# Patient Record
Sex: MALE | Race: WHITE | NOT HISPANIC OR LATINO | Employment: OTHER | URBAN - METROPOLITAN AREA
[De-identification: names, ages, dates, MRNs, and addresses within clinical notes are randomized per-mention and may not be internally consistent; named-entity substitution may affect disease eponyms.]

---

## 2023-10-09 ENCOUNTER — TELEPHONE (OUTPATIENT)
Dept: NEUROLOGY | Facility: CLINIC | Age: 68
End: 2023-10-09
Payer: COMMERCIAL

## 2023-10-09 DIAGNOSIS — G43.711 INTRACTABLE CHRONIC MIGRAINE WITHOUT AURA AND WITH STATUS MIGRAINOSUS: ICD-10-CM

## 2023-10-09 RX ORDER — HYDROCODONE BITARTRATE AND ACETAMINOPHEN 7.5; 325 MG/1; MG/1
1 TABLET ORAL EVERY 12 HOURS PRN
Qty: 40 TABLET | Refills: 0 | Status: SHIPPED | OUTPATIENT
Start: 2023-10-09 | End: 2023-11-09 | Stop reason: SDUPTHER

## 2023-10-09 RX ORDER — HYDROCODONE BITARTRATE AND ACETAMINOPHEN 7.5; 325 MG/1; MG/1
1 TABLET ORAL EVERY 12 HOURS PRN
COMMUNITY
Start: 2023-10-09 | End: 2023-10-09 | Stop reason: SDUPTHER

## 2023-10-11 DIAGNOSIS — G44.029 CHRONIC CLUSTER HEADACHE, NOT INTRACTABLE: ICD-10-CM

## 2023-10-11 RX ORDER — GALCANEZUMAB 100 MG/ML
300 INJECTION, SOLUTION SUBCUTANEOUS
COMMUNITY
End: 2023-10-11 | Stop reason: SDUPTHER

## 2023-10-11 RX ORDER — GALCANEZUMAB 100 MG/ML
300 INJECTION, SOLUTION SUBCUTANEOUS
Qty: 3 ML | Refills: 6 | Status: SHIPPED | OUTPATIENT
Start: 2023-10-11 | End: 2024-05-09 | Stop reason: SDUPTHER

## 2023-11-08 PROBLEM — G89.29 CHRONIC HEADACHES: Status: ACTIVE | Noted: 2023-11-08

## 2023-11-08 PROBLEM — E78.00 ELEVATED SERUM CHOLESTEROL: Status: ACTIVE | Noted: 2023-11-08

## 2023-11-08 PROBLEM — E55.9 VITAMIN D DEFICIENCY: Status: ACTIVE | Noted: 2023-11-08

## 2023-11-08 PROBLEM — I77.819 DILATATION OF AORTA (CMS-HCC): Status: ACTIVE | Noted: 2023-11-08

## 2023-11-08 PROBLEM — M54.2 CERVICALGIA OF OCCIPITO-ATLANTO-AXIAL REGION: Status: ACTIVE | Noted: 2023-11-08

## 2023-11-08 PROBLEM — R51.9 CHRONIC HEADACHES: Status: ACTIVE | Noted: 2023-11-08

## 2023-11-08 PROBLEM — R94.31 ABNORMAL EKG: Status: ACTIVE | Noted: 2023-11-08

## 2023-11-08 PROBLEM — G44.009 CLUSTER HEADACHE: Status: ACTIVE | Noted: 2023-11-08

## 2023-11-08 PROBLEM — F41.9 ANXIETY: Status: ACTIVE | Noted: 2023-11-08

## 2023-11-08 RX ORDER — SUMATRIPTAN SUCCINATE 100 MG/1
100 TABLET ORAL AS NEEDED
COMMUNITY
End: 2024-02-22 | Stop reason: SDUPTHER

## 2023-11-08 RX ORDER — POTASSIUM CHLORIDE 1500 MG/1
1 TABLET, EXTENDED RELEASE ORAL DAILY
COMMUNITY
Start: 2015-09-28

## 2023-11-08 RX ORDER — ATORVASTATIN CALCIUM 80 MG/1
80 TABLET, FILM COATED ORAL DAILY
COMMUNITY
End: 2023-11-09

## 2023-11-08 RX ORDER — L.ACID,FERM,PLA,RHA/B.BIF,LONG 126 MG
TABLET, DELAYED AND EXTENDED RELEASE ORAL
COMMUNITY
Start: 2022-08-04

## 2023-11-08 RX ORDER — PANTOPRAZOLE SODIUM 40 MG/1
40 TABLET, DELAYED RELEASE ORAL
COMMUNITY
End: 2024-05-09 | Stop reason: ALTCHOICE

## 2023-11-08 RX ORDER — TRIAMTERENE/HYDROCHLOROTHIAZID 37.5-25 MG
1 TABLET ORAL DAILY
COMMUNITY

## 2023-11-08 RX ORDER — ALPRAZOLAM 0.25 MG/1
0.25 TABLET ORAL
COMMUNITY
Start: 2023-09-12

## 2023-11-08 RX ORDER — FUROSEMIDE 20 MG/1
20 TABLET ORAL
COMMUNITY
Start: 2023-09-17

## 2023-11-08 RX ORDER — CITALOPRAM 40 MG/1
40 TABLET, FILM COATED ORAL DAILY
COMMUNITY

## 2023-11-08 RX ORDER — ARIPIPRAZOLE 2 MG/1
1 TABLET ORAL NIGHTLY
COMMUNITY
Start: 2022-05-05 | End: 2024-05-29

## 2023-11-08 RX ORDER — CYCLOBENZAPRINE HCL 10 MG
10 TABLET ORAL
COMMUNITY
Start: 2022-11-30 | End: 2024-05-09 | Stop reason: ALTCHOICE

## 2023-11-08 RX ORDER — VERAPAMIL HYDROCHLORIDE 240 MG/1
1 TABLET, FILM COATED, EXTENDED RELEASE ORAL 2 TIMES DAILY
COMMUNITY
Start: 2016-07-22

## 2023-11-08 RX ORDER — ROSUVASTATIN CALCIUM 40 MG/1
40 TABLET, COATED ORAL DAILY
COMMUNITY
Start: 2023-09-10

## 2023-11-08 RX ORDER — PANTOPRAZOLE SODIUM 20 MG/1
20 TABLET, DELAYED RELEASE ORAL
COMMUNITY
Start: 2023-10-08 | End: 2024-05-09 | Stop reason: ALTCHOICE

## 2023-11-08 RX ORDER — GABAPENTIN 300 MG/1
300 CAPSULE ORAL 3 TIMES DAILY
COMMUNITY
Start: 2023-03-03 | End: 2023-04-02 | Stop reason: WASHOUT

## 2023-11-08 RX ORDER — METHYLPREDNISOLONE 4 MG/1
TABLET ORAL
COMMUNITY
Start: 2022-11-30 | End: 2024-05-09 | Stop reason: ALTCHOICE

## 2023-11-08 RX ORDER — VIT C/E/ZN/COPPR/LUTEIN/ZEAXAN 250MG-90MG
25 CAPSULE ORAL
COMMUNITY
Start: 2018-06-25

## 2023-11-09 ENCOUNTER — OFFICE VISIT (OUTPATIENT)
Dept: NEUROLOGY | Facility: CLINIC | Age: 68
End: 2023-11-09
Payer: COMMERCIAL

## 2023-11-09 VITALS — DIASTOLIC BLOOD PRESSURE: 74 MMHG | SYSTOLIC BLOOD PRESSURE: 128 MMHG | HEART RATE: 78 BPM | RESPIRATION RATE: 18 BRPM

## 2023-11-09 DIAGNOSIS — G43.711 INTRACTABLE CHRONIC MIGRAINE WITHOUT AURA AND WITH STATUS MIGRAINOSUS: ICD-10-CM

## 2023-11-09 DIAGNOSIS — G44.029 CHRONIC CLUSTER HEADACHE, NOT INTRACTABLE: Primary | ICD-10-CM

## 2023-11-09 PROCEDURE — 1036F TOBACCO NON-USER: CPT | Performed by: PSYCHIATRY & NEUROLOGY

## 2023-11-09 PROCEDURE — 1159F MED LIST DOCD IN RCRD: CPT | Performed by: PSYCHIATRY & NEUROLOGY

## 2023-11-09 PROCEDURE — 1160F RVW MEDS BY RX/DR IN RCRD: CPT | Performed by: PSYCHIATRY & NEUROLOGY

## 2023-11-09 PROCEDURE — 99214 OFFICE O/P EST MOD 30 MIN: CPT | Performed by: PSYCHIATRY & NEUROLOGY

## 2023-11-09 RX ORDER — HYDROCODONE BITARTRATE AND ACETAMINOPHEN 7.5; 325 MG/1; MG/1
1 TABLET ORAL EVERY 12 HOURS PRN
Qty: 40 TABLET | Refills: 0 | Status: SHIPPED | OUTPATIENT
Start: 2023-11-11 | End: 2023-12-11 | Stop reason: SDUPTHER

## 2023-11-09 ASSESSMENT — PATIENT HEALTH QUESTIONNAIRE - PHQ9
2. FEELING DOWN, DEPRESSED OR HOPELESS: NOT AT ALL
1. LITTLE INTEREST OR PLEASURE IN DOING THINGS: NOT AT ALL
SUM OF ALL RESPONSES TO PHQ9 QUESTIONS 1 AND 2: 0

## 2023-11-09 NOTE — PROGRESS NOTES
OARRS:    I have personally reviewed the OARRS report for Colby Santa. I have considered the risks of abuse, dependence, addiction and diversion    Is the patient prescribed a combination of a benzodiazepine and opioid?  Yes, I feel it is clincially indicated to continue the medication and have discussed with the patient risks/benefits/alternatives.    Last Urine Drug Screen / ordered today: 4/23    Results are as expected.     Clinical rationale for not completing a Urine Drug Screen: Has urine done at [facility] due to insurance limits      Controlled Substance Agreement:  Date of the Last Agreement: 11/9/23  Reviewed Controlled Substance Agreement including but not limited to the benefits, risks, and alternatives to treatment with a Controlled Substance medication(s).    Opioids:  What is the patient's goal of therapy? Cluster headache control  Is this being achieved with current treatment? Yes    I have calculated the patient's Morphine Dose Equivalent (MED):   I have considered referral to Pain Management and/or a specialist, and do not feel it is necessary at this time.    I feel that it is clinically indicated to continue this current medication regimen after consideration of alternative therapies, and other non-opioid treatment.    Opioid Risk Screening: Benzodiazepine and opioid being used without consequence      Pain Assessment: Hydrocodone helps with the pain         Subjective     Chief Complaint: Cluster Headache    Colby Santa is a 68 y.o. year old male who presents with chief complaint of cluster headaches.    HPI    Continues monthly Emgality cluster dosing.  Been on emgality for 2 years. Reports daytime clusters (pain 6-8/10)that he can get rid of with sumatriptan, night time headaches can't get rid of with sumatriptan, needs hydrocodone and oxygen.   2-3 clusters per week. In the Last 30-45 days pain has decreased to 4/10 pain Thinks it is better weather in summer for him. Has been able to  treat with sumatriptan and has not needed Vicodin as pain level has been less. Trigger is zoom meetings. Has not needed Oxygen PRN. In past 45 days.   Treats within 20-30 min completely. These headaches worsen when the cooler weather starts. Cluster headache behind right eye. Feels like ice pik.    If not complete relief from Vicodin and has lingering aching head pain in evening will take Sumatriptan      Has been walking more and enjoying the exercise and feels better. Did not feel motivated to walk outside until friend moved into area. Also recently had hip injection and can walk without a cane. Looking at hip surgeyr in 1 year. Celexa/abilify combo continues to work well for mood stabilization. More energy and sleeping better.   Work stress is about the same or a little increased but feels is managing well.   Has not been causing any headaches.     Gets all over headache every once in a while. With rare exception it is not any where near as painful as cluster.   Treats with ibuprofen and helpful. Voltaren gel helps. PT helps  Tries to limit ibuprofen to taking with food as led in past to his gastric ulcer.     Migraine More holocephalic and throbbing. Rare  Sumatriptan and Vicodin helpful   Associated nausea, light and noise sensitivity.  Triggers are stress    aortic dilation cardiologist screens every other year. Cardiologist september 18th for tachycardia  Medication-induced Stress test couple of weeks ago with cardiology, reports test was normal and was told he is at low risk for heart disease. Had a cluster every day for several days following this test.     Balance problem in PT better with hip pain relieved.  Reports he may need hip surgery    Work attendance or other daily activities are not affected by the headaches.    Current Acute Headache Treatment Sumatriptan   Current Preventative Headache Treatment Galcanezumab (Emgality)   Previous Acute Headache Treatment  None   Previous Preventative Headache  Treatment None       ROS: As per HPI, otherwise all other systems have been reviewed are negative for complaint.     Review of Systems    Past Medical History:   Diagnosis Date    Cluster headache     Headache     Headache, tension-type     Migraine     Other long term (current) drug therapy 02/03/2022    Long term use of drug    Other long term (current) drug therapy 05/06/2021    Controlled substance agreement signed     Past Surgical History:   Procedure Laterality Date    OTHER SURGICAL HISTORY  09/10/2019    No history of surgery     Family History   Problem Relation Name Age of Onset    COPD Mother      Lung cancer Mother      Dementia Father Colby     Parkinsonism Father Colby      Social History     Tobacco Use    Smoking status: Never    Smokeless tobacco: Never   Substance Use Topics    Alcohol use: Yes     Alcohol/week: 7.0 standard drinks of alcohol     Types: 7 Standard drinks or equivalent per week        Objective   /74   Pulse 78   Resp 18       Results  No visits with results within 2 Month(s) from this visit.   Latest known visit with results is:   Legacy Encounter on 01/22/2021   Component Date Value    Cholesterol 01/22/2021 193     HDL 01/22/2021 64.0     Cholesterol/HDL Ratio 01/22/2021 3.0     LDL 01/22/2021 102 (H)     VLDL 01/22/2021 27     Triglycerides 01/22/2021 136      Assessment/Plan   Problem List Items Addressed This Visit          Neuro    Cluster headache - Primary     Other Visit Diagnoses       Intractable chronic migraine without aura and with status migrainosus        Relevant Medications    HYDROcodone-acetaminophen (Norco) 7.5-325 mg tablet (Start on 11/11/2023)          Impression:  Mr. Santa is a 67 yo gentleman with aortic dilation, hyperlipidemia, anxiety, returning to clinic today for management of chronic cluster headaches, being treated with Emgality, sumatriptan PRN, Hydrocodone-acetaminophen Prn, oxygen PRN. Cluster headaches patient reports are well  controlled, still 2-3 times a week in frequency greatly reduced from daily before treatment.     Plan:  -Continue Emgality 300mg injection monthly  -Continue Sumatriptan 100mg PRN  -Continuing acetaminophen hydrocodone PRN, consent renewed today.  -Continue PRN oxygen  -Return to clinic in 3 months    Елена Lipscomb MD  Neurology PGY-2    Pt seen and discussed with the attending physician, Dr. Millan

## 2023-12-07 ENCOUNTER — TELEPHONE (OUTPATIENT)
Dept: NEUROLOGY | Facility: CLINIC | Age: 68
End: 2023-12-07
Payer: COMMERCIAL

## 2023-12-07 NOTE — TELEPHONE ENCOUNTER
Called to refill HYDROcodone-acetaminophen (Norco) 7.5-325 mg tablet.  [facility]ie is [facility]pharmacy #[id number] - [place], [place]

## 2023-12-11 DIAGNOSIS — G43.711 INTRACTABLE CHRONIC MIGRAINE WITHOUT AURA AND WITH STATUS MIGRAINOSUS: ICD-10-CM

## 2023-12-11 RX ORDER — HYDROCODONE BITARTRATE AND ACETAMINOPHEN 7.5; 325 MG/1; MG/1
1 TABLET ORAL EVERY 12 HOURS PRN
Qty: 40 TABLET | Refills: 0 | Status: SHIPPED | OUTPATIENT
Start: 2023-12-11 | End: 2024-01-05 | Stop reason: SDUPTHER

## 2024-01-05 ENCOUNTER — TELEPHONE (OUTPATIENT)
Dept: NEUROLOGY | Facility: CLINIC | Age: 69
End: 2024-01-05
Payer: COMMERCIAL

## 2024-01-05 DIAGNOSIS — G43.711 INTRACTABLE CHRONIC MIGRAINE WITHOUT AURA AND WITH STATUS MIGRAINOSUS: ICD-10-CM

## 2024-01-05 RX ORDER — HYDROCODONE BITARTRATE AND ACETAMINOPHEN 7.5; 325 MG/1; MG/1
1 TABLET ORAL EVERY 12 HOURS PRN
Qty: 40 TABLET | Refills: 0 | Status: SHIPPED | OUTPATIENT
Start: 2024-01-10 | End: 2024-02-07 | Stop reason: SDUPTHER

## 2024-01-05 NOTE — TELEPHONE ENCOUNTER
HYDROcodone-acetaminophen (Norco) 7.5-325 mg tablet. Pharmacy is [facility]/pharmacy #[id number] - [place], [place]

## 2024-02-01 ENCOUNTER — APPOINTMENT (OUTPATIENT)
Dept: NEUROLOGY | Facility: CLINIC | Age: 69
End: 2024-02-01
Payer: COMMERCIAL

## 2024-02-05 ENCOUNTER — TELEMEDICINE (OUTPATIENT)
Dept: NEUROLOGY | Facility: CLINIC | Age: 69
End: 2024-02-05
Payer: COMMERCIAL

## 2024-02-05 DIAGNOSIS — G44.029 CHRONIC CLUSTER HEADACHE, NOT INTRACTABLE: Primary | ICD-10-CM

## 2024-02-05 PROCEDURE — 1036F TOBACCO NON-USER: CPT

## 2024-02-05 PROCEDURE — 1159F MED LIST DOCD IN RCRD: CPT

## 2024-02-05 PROCEDURE — 99213 OFFICE O/P EST LOW 20 MIN: CPT

## 2024-02-05 NOTE — PATIENT INSTRUCTIONS
I am glad to hear you are doing well.   Continue emgality for prevention of cluster headaches and sumatriptan and hydrocodone for rescue.   Urine test will need to be updated for April 2024.   Follow up in 3 months or sooner as needed.

## 2024-02-05 NOTE — ASSESSMENT & PLAN NOTE
Doing much better since being on emgality. Had a brief 10 day period with more clusters when it was very cold otherwise only 2-3x per week and manageable with sumatriptan first then hydrocodone if needed.   - OARRS reviewed  - UDS and CSA up to date. UDS needs updated in April, pt aware.   - Dr. Millan to refill hydrocodone.     F/u in 3 mos

## 2024-02-05 NOTE — PROGRESS NOTES
"Subjective     Colby Santa is a  68 y.o. male  with a past medical history of anxiety, dilatation of the aortia, HLD, cluster headaches, cervicalgia of occipito-atlantoaxial region who presents with   Chief Complaint   Patient presents with    Headache     Visit type: follow up visit    HPI   Pt was last seen in a by Dr. Millan in 11/2023.   At that time, he reported cluster headaches were well controlled, still 2-3x week but greatly reduced. She continued emgality 300 mg monthly, sumatriptan 100 mg PRN, PRN oxygen, and acetaminophen-hydrocodone PRN. Consent was renewed that day and UDS ordered but I do not see results.     2/5/24  Since then, pt has been doing very well.  Still feels like the Emgality has stopped his REM sleep headaches. Still has some cluster headaches during the day but sumatriptan or vicodin helps. He had a particularly bad week when it was very cold a few weeks ago, otherwise still 2-3x per week. He has oxygen but not using it unless episode \"gets away from him.\" Has not had to use it in awhile. He is using sumatriptan a few times per week. He will try this before the hydrocodone.     Work attendance or other daily activities affected: no    Review of Systems  10 point review of systems performed and is negative except as noted in the HPI.     All other systems have been reviewed and are negative for complaint.    Past Medical History:   Diagnosis Date    Cluster headache     Headache     Headache, tension-type     Migraine     Other long term (current) drug therapy 02/03/2022    Long term use of drug    Other long term (current) drug therapy 05/06/2021    Controlled substance agreement signed       Family medical history includes dementia in father.    Past Surgical History:   Procedure Laterality Date    OTHER SURGICAL HISTORY  09/10/2019    No history of surgery       Social History     Substance and Sexual Activity   Drug Use Not Currently     Tobacco Use: Low Risk  (2/5/2024)    Patient " History     Smoking Tobacco Use: Never     Smokeless Tobacco Use: Never     Passive Exposure: Not on file     Alcohol Use: Not on file           Objective     Neurological Exam  Mental Status  Awake, alert and oriented to person, place and time. Oriented to person, place, time and situation. Recent and remote memory are intact. Speech is normal. Language is fluent with no aphasia. Attention and concentration are normal.    Cranial Nerves  CN III, IV, VI: Extraocular movements intact bilaterally. Normal lids and orbits bilaterally.  CN VII: Full and symmetric facial movement.  CN VIII: Hearing is normal.    Motor   No abnormal involuntary movements.  Strength not assessed due to virtual nature of visit..          No results found for this or any previous visit.           Assessment/Plan   Problem List Items Addressed This Visit       Cluster headache - Primary    Overview     On emgality 300 mg for prevention   Sumatriptan, oxygen and hydrocodone for rescue          Current Assessment & Plan     Doing much better since being on emgality. Had a brief 10 day period with more clusters when it was very cold otherwise only 2-3x per week and manageable with sumatriptan first then hydrocodone if needed.   - OARRS reviewed  - UDS and CSA up to date. UDS needs updated in April, pt aware.   - Dr. Millan to refill hydrocodone.     F/u in 3 mos             A video visit between the patient (at the originating site) and the provider (at the distant site) was utilized to provide this telehealth service.     Verbal consent was requested and obtained from the patient on this date for a telehealth visit. The patient was informed about the telehealth clinical encounter including benefits to avoiding travel, limitations to the assessment, and billing for the service. In person care may be recommended if needed. Telehealth sessions are not being recorded and personal health information is protected. All questions were answered and verbal  informal consent was obtained from the patient to proceed.    I have personally reviewed the OARRS report for this patient. The report is scanned into the electronic medical record. I have considered the risks of abuse, dependence, addiction and diversion. I believe that it is clinically appropriate for this patient to be prescribed this medication based on documented diagnosis of headache. An updated contract between the patient and Dr. Millan is in the EMR and Dr. Millan will provide the refills.

## 2024-02-07 ENCOUNTER — TELEPHONE (OUTPATIENT)
Dept: NEUROLOGY | Facility: CLINIC | Age: 69
End: 2024-02-07
Payer: COMMERCIAL

## 2024-02-07 DIAGNOSIS — G43.711 INTRACTABLE CHRONIC MIGRAINE WITHOUT AURA AND WITH STATUS MIGRAINOSUS: ICD-10-CM

## 2024-02-07 RX ORDER — HYDROCODONE BITARTRATE AND ACETAMINOPHEN 7.5; 325 MG/1; MG/1
1 TABLET ORAL EVERY 12 HOURS PRN
Qty: 40 TABLET | Refills: 0 | Status: SHIPPED | OUTPATIENT
Start: 2024-02-09 | End: 2024-03-08 | Stop reason: SDUPTHER

## 2024-02-07 NOTE — TELEPHONE ENCOUNTER
Called to refill HYDROcodone-acetaminophen (Norco) 7.5-325 mg tablet.  Pharmacy is   [facility]/pharmacy #[id number] - [facility] [address] [facility] Phone: [phone]        
Last appointment 2/5/24  No future appointments scheduled- left message     Sent to MD  
Abdomen soft, non-tender and non-distended, no rebound, no guarding and no masses. no hepatosplenomegaly.

## 2024-02-22 ENCOUNTER — TELEPHONE (OUTPATIENT)
Dept: NEUROLOGY | Facility: CLINIC | Age: 69
End: 2024-02-22
Payer: COMMERCIAL

## 2024-02-22 DIAGNOSIS — G43.711 INTRACTABLE CHRONIC MIGRAINE WITHOUT AURA AND WITH STATUS MIGRAINOSUS: ICD-10-CM

## 2024-02-22 RX ORDER — SUMATRIPTAN SUCCINATE 100 MG/1
100 TABLET ORAL AS NEEDED
Qty: 9 TABLET | Refills: 3 | Status: SHIPPED | OUTPATIENT
Start: 2024-02-22

## 2024-02-22 NOTE — TELEPHONE ENCOUNTER
Called to refill Sumatriptan   100 MG Tab    Pharmacy is   [facility]/pharmacy #[id number] - [facility] [address] [address] AT [facility] Phone: [phone]

## 2024-03-06 ENCOUNTER — TELEPHONE (OUTPATIENT)
Dept: NEUROLOGY | Facility: CLINIC | Age: 69
End: 2024-03-06
Payer: COMMERCIAL

## 2024-03-06 DIAGNOSIS — G43.711 INTRACTABLE CHRONIC MIGRAINE WITHOUT AURA AND WITH STATUS MIGRAINOSUS: ICD-10-CM

## 2024-03-06 RX ORDER — HYDROCODONE BITARTRATE AND ACETAMINOPHEN 7.5; 325 MG/1; MG/1
1 TABLET ORAL EVERY 12 HOURS PRN
Qty: 40 TABLET | Refills: 0 | OUTPATIENT
Start: 2024-03-08

## 2024-03-06 NOTE — TELEPHONE ENCOUNTER
Need a urine test first. Last one was not comparable to the requirements  has set forth in our agreements.

## 2024-03-06 NOTE — TELEPHONE ENCOUNTER
Called to refill HYDROcodone-acetaminophen (Norco) 7.5-325 mg tablet.  Pharmacy is   [facility]/pharmacy #[id number] - [facility] [address] [address] AT [facility] Phone: [phone]

## 2024-03-08 DIAGNOSIS — G43.711 INTRACTABLE CHRONIC MIGRAINE WITHOUT AURA AND WITH STATUS MIGRAINOSUS: ICD-10-CM

## 2024-03-08 RX ORDER — SILDENAFIL CITRATE 20 MG/1
60 TABLET ORAL
COMMUNITY
Start: 2021-05-11

## 2024-03-08 RX ORDER — IBUPROFEN 800 MG/1
800 TABLET ORAL EVERY 8 HOURS PRN
COMMUNITY
Start: 2024-02-28

## 2024-03-08 RX ORDER — HYDROCODONE BITARTRATE AND ACETAMINOPHEN 7.5; 325 MG/1; MG/1
1 TABLET ORAL EVERY 12 HOURS PRN
Qty: 40 TABLET | Refills: 0 | Status: SHIPPED | OUTPATIENT
Start: 2024-03-10 | End: 2024-05-01 | Stop reason: SDUPTHER

## 2024-03-08 RX ORDER — EZETIMIBE 10 MG/1
10 TABLET ORAL DAILY
COMMUNITY
Start: 2024-02-28

## 2024-04-26 ENCOUNTER — TELEPHONE (OUTPATIENT)
Dept: NEUROLOGY | Facility: CLINIC | Age: 69
End: 2024-04-26
Payer: COMMERCIAL

## 2024-04-26 DIAGNOSIS — G43.711 INTRACTABLE CHRONIC MIGRAINE WITHOUT AURA AND WITH STATUS MIGRAINOSUS: ICD-10-CM

## 2024-04-26 NOTE — TELEPHONE ENCOUNTER
Called to refill HYDROcodone-acetaminophen (Norco) 7.5-325 mg tablet.  Pharmacy is   [facility]/pharmacy #[id number] - [facility] [address] [address] AT [facility] Phone: [phone]

## 2024-05-01 DIAGNOSIS — G43.711 INTRACTABLE CHRONIC MIGRAINE WITHOUT AURA AND WITH STATUS MIGRAINOSUS: ICD-10-CM

## 2024-05-01 RX ORDER — HYDROCODONE BITARTRATE AND ACETAMINOPHEN 7.5; 325 MG/1; MG/1
1 TABLET ORAL EVERY 12 HOURS PRN
Qty: 40 TABLET | Refills: 0 | Status: SHIPPED | OUTPATIENT
Start: 2024-05-01 | End: 2024-05-31 | Stop reason: SDUPTHER

## 2024-05-01 NOTE — TELEPHONE ENCOUNTER
Finally received clarification on outide urine drug screen detail.   Refill ordered. Awaiting MD auth

## 2024-05-09 ENCOUNTER — OFFICE VISIT (OUTPATIENT)
Dept: NEUROLOGY | Facility: CLINIC | Age: 69
End: 2024-05-09
Payer: COMMERCIAL

## 2024-05-09 VITALS — RESPIRATION RATE: 20 BRPM | HEART RATE: 80 BPM | SYSTOLIC BLOOD PRESSURE: 130 MMHG | DIASTOLIC BLOOD PRESSURE: 76 MMHG

## 2024-05-09 DIAGNOSIS — G62.9 NEUROPATHY: Primary | ICD-10-CM

## 2024-05-09 DIAGNOSIS — G44.029 CHRONIC CLUSTER HEADACHE, NOT INTRACTABLE: ICD-10-CM

## 2024-05-09 PROCEDURE — 99215 OFFICE O/P EST HI 40 MIN: CPT | Performed by: PSYCHIATRY & NEUROLOGY

## 2024-05-09 PROCEDURE — 1159F MED LIST DOCD IN RCRD: CPT | Performed by: PSYCHIATRY & NEUROLOGY

## 2024-05-09 RX ORDER — GALCANEZUMAB 100 MG/ML
300 INJECTION, SOLUTION SUBCUTANEOUS
Qty: 3 ML | Refills: 6 | Status: SHIPPED | OUTPATIENT
Start: 2024-05-09

## 2024-05-09 ASSESSMENT — ENCOUNTER SYMPTOMS
OCCASIONAL FEELINGS OF UNSTEADINESS: 1
LOSS OF SENSATION IN FEET: 0
DEPRESSION: 0

## 2024-05-09 NOTE — PATIENT INSTRUCTIONS
[unfilled]  Problem List Items Addressed This Visit       Cluster headache    Relevant Medications    Emgality Syringe 300 mg/3 mL (100 mg/mL x 3) prefilled syringe    Other Relevant Orders    Referral to Physical Therapy     Other Visit Diagnoses       Neuropathy    -  Primary    Relevant Orders    Vitamin B12    Vitamin D 25-Hydroxy,Total (for eval of Vitamin D levels)    Vitamin B1, Whole Blood    Hemoglobin A1C    Comprehensive Metabolic Panel    CBC and Auto Differential    Creatine Kinase    Aldolase    AST    ALT    Referral to Neurology           Assess/ plan    Cluster/rem headache continue current plan  Gait imbalance- Physical therapy follow up with ortho, pt through his office  Neuropathy- Labs and PT through ortho's office

## 2024-05-09 NOTE — PROGRESS NOTES
Fall 5-5-2024 while carrying groceries into the house. Struck left frontal portion of head. Abrasion and blackened left eye currently. Did not feel dizzy or trip. Did not access eR. States no noticeable increase in headache.   Gait is unsteady with compensating for right hip pain.   Endorses off balance frequently, steadies self on furniture or other things. Other falls without injury.       Continues monthly Emgality cluster dosing.  Been on emgality for 2 + years. Reports daytime clusters (pain 6-8/10)that he can get rid of with sumatriptan,   NO REM sleep headaches due to Emgaltiy efficacy. Has not needed oxygen or hydrocodone during night in quite some time.    2-3 clusters per week during day . Sumatriptan or Vicodin depending on severity or delayed treatment. Usually 1 vicodin will work.  Thinks it is better weather in summer for him.   When not severe pain sumatriptan is effective if treats early. If more severe, will use Vicodin  Trigger is zoom meetings or deadlines or increased anxiety.  Treats within 20-30 min completely. These headaches worsen when the cooler weather starts. Cluster headache behind right eye. Feels like ice pik.     If not compete relief from sumatriptan, will follow with Vicodin     Has been doing exercises for right hip to increase flexibilty and seems to be helpful. Was to have another hip injection at beginning of May and had to cancel. Plans to reschedule. Is fearful of hip surgery  but knows he needs to eventually.   Celexa/abilify combo continues to work well for mood stabilization. More energy and sleeping better.   Work stress is about the same or a little increased but feels is managing well.   Has not been causing any headaches.      Gets all over headache every once in a while. With rare exception it is not any where near as painful as cluster.   Treats with ibuprofen and helpful. Voltaren gel helps. PT helps  Tries to limit ibuprofen to taking with food as led in past to his  gastric ulcer.      Migraine More holocephalic and throbbing. Rare. Did have a couple after he fell the other day  Sumatriptan and Vicodin helpful   Associated nausea, light and noise sensitivity.  Triggers are stress     aortic dilation cardiologist screens every other year. Cardiologist september 18th for tachycardia        Balance problem in PT better with hip pain relieved.  Reports he may need hip surgery     Work attendance or other daily activities are not affected by the headaches.    GENERAL APPEARANCE:  No distress, alert, interactive and cooperative.     CARDIOVASCULAR: Regular rate and rhythm. Radial pulses +2 and equal. No swelling, varicosities, edema, or tenderness to palpation.      MENTAL STATE:   Orientation was normal to time, place and person. Recent and remote memory was intact.  Attention span and concentration were normal. Language testing was normal for comprehension, repetition, expression, and naming. The patient could correctly interpret a picture. General fund of knowledge was intact.     OPHTHALMOSCOPIC:   The ophthalmoscopic exam was deferred    CRANIAL NERVES:   CN 2   Visual fields full to confrontation.   CN 3, 4, 6   Pupils round, 6 mm in diameter, equally reactive to light. Lids symmetric; no ptosis. EOMs normal alignment, full range with normal saccades, pursuit and convergence.   No nystagmus. L eye moderately bruised   CN 5   Facial sensation intact bilaterally.   CN 7   Normal and symmetric facial strength. Nasolabial folds symmetric.   CN 8   Hearing intact to conversation.  CN 9/10  Palate elevates symmetrically.   CN 11   Normal strength of shoulder shrug and neck turning.   CN 12   Tongue midline, with normal bulk and strength; no fasciculations.     MOTOR:   Muscle bulk and tone were normal in both upper and lower extremities.   No fasciculations, tremor or other abnormal movements were present.                         R          L  Deltoids        5          5  Biceps           5          5  Triceps          5          5  Wrist Flex      5          5  Wrist Ext       5          5    Hip Flex         5          5  Knee Flex      5          5  Knee Ext        5          5  Dorsiflex         5          5  Plantarflex      5          5    REFLEXES:                       R          L  BR:               2         2  Biceps:         3          2  Triceps:        2          2  Knee:            2         3  Ankle:          0          0    Babinski: toes mute    SENSORY:   In both upper and lower extremities, sensation was intact to light touch    Length dependent decrease to pinprick (up to mid shin)  Proprioception intact  Vibration not intact   Temp intact      COORDINATION:    In both upper extremities, finger-nose-finger was intact without dysmetria or overshoot.   In both lower extremities, heel-to-shin was intact.    Romberg negative     GAIT:   Gait was antalgic. Cannot tandem.       OARRS:  No data recorded  I have personally reviewed the OARRS report for Colby Santa. I have considered the risks of abuse, dependence, addiction and diversion    Is the patient prescribed a combination of a benzodiazepine and opioid?  Yes. 2 processes going on.     Last Urine Drug Screen / ordered today: No  No results found for this or any previous visit (from the past 8760 hour(s)).  See scanned outside lab 4-  Results are as expected.     Clinical rationale for not completing a Urine Drug Screen: done at outside lab      Controlled Substance Agreement:  Date of the Last Agreement: 11/9/2023  Reviewed Controlled Substance Agreement including but not limited to the benefits, risks, and alternatives to treatment with a Controlled Substance medication(s).    Opioids:  What is the patient's goal of therapy? Head pain control  Is this being achieved with current treatment? yes    I have calculated the patient's Morphine Dose Equivalent (MED):   I have considered referral to Pain Management and/or  a specialist, and do not feel it is necessary at this time.    I feel that it is clinically indicated to continue this current medication regimen after consideration of alternative therapies, and other non-opioid treatment.    Opioid Risk Screening:  No data recorded    Pain Assessment:  No data recorded    Assess/ plan    Cluster/rem headache continue current plan  Gait imbalance- Physical therapy follow up with ortho, pt through his office  Neuropathy- Labs and PT through ortho's office

## 2024-05-26 ENCOUNTER — TELEPHONE (OUTPATIENT)
Dept: NEUROLOGY | Facility: CLINIC | Age: 69
End: 2024-05-26
Payer: COMMERCIAL

## 2024-05-26 DIAGNOSIS — F41.9 ANXIETY DISORDER, UNSPECIFIED: ICD-10-CM

## 2024-05-29 RX ORDER — ARIPIPRAZOLE 2 MG/1
2 TABLET ORAL NIGHTLY
Qty: 30 TABLET | Refills: 8 | Status: SHIPPED | OUTPATIENT
Start: 2024-05-29

## 2024-05-31 DIAGNOSIS — G43.711 INTRACTABLE CHRONIC MIGRAINE WITHOUT AURA AND WITH STATUS MIGRAINOSUS: ICD-10-CM

## 2024-05-31 RX ORDER — HYDROCODONE BITARTRATE AND ACETAMINOPHEN 7.5; 325 MG/1; MG/1
1 TABLET ORAL EVERY 12 HOURS PRN
Qty: 40 TABLET | Refills: 0 | Status: SHIPPED | OUTPATIENT
Start: 2024-05-31

## 2024-06-14 DIAGNOSIS — G44.009 CLUSTER HEADACHE SYNDROME, UNSPECIFIED, NOT INTRACTABLE: ICD-10-CM

## 2024-06-14 RX ORDER — CITALOPRAM 40 MG/1
40 TABLET, FILM COATED ORAL DAILY
Qty: 30 TABLET | Refills: 11 | Status: SHIPPED | OUTPATIENT
Start: 2024-06-14

## 2024-06-14 RX ORDER — VERAPAMIL HYDROCHLORIDE 240 MG/1
240 TABLET, FILM COATED, EXTENDED RELEASE ORAL 2 TIMES DAILY
Qty: 180 TABLET | Refills: 3 | Status: SHIPPED | OUTPATIENT
Start: 2024-06-14

## 2024-06-27 DIAGNOSIS — G43.711 INTRACTABLE CHRONIC MIGRAINE WITHOUT AURA AND WITH STATUS MIGRAINOSUS: ICD-10-CM

## 2024-06-27 RX ORDER — HYDROCODONE BITARTRATE AND ACETAMINOPHEN 7.5; 325 MG/1; MG/1
1 TABLET ORAL EVERY 12 HOURS PRN
Qty: 40 TABLET | Refills: 0 | Status: SHIPPED | OUTPATIENT
Start: 2024-06-30

## 2024-07-26 DIAGNOSIS — G43.711 INTRACTABLE CHRONIC MIGRAINE WITHOUT AURA AND WITH STATUS MIGRAINOSUS: ICD-10-CM

## 2024-07-26 RX ORDER — HYDROCODONE BITARTRATE AND ACETAMINOPHEN 7.5; 325 MG/1; MG/1
1 TABLET ORAL EVERY 12 HOURS PRN
Qty: 40 TABLET | Refills: 0 | Status: SHIPPED | OUTPATIENT
Start: 2024-07-26

## 2024-07-30 ENCOUNTER — TELEPHONE (OUTPATIENT)
Dept: NEUROLOGY | Facility: CLINIC | Age: 69
End: 2024-07-30
Payer: COMMERCIAL

## 2024-07-30 DIAGNOSIS — G43.711 INTRACTABLE CHRONIC MIGRAINE WITHOUT AURA AND WITH STATUS MIGRAINOSUS: ICD-10-CM

## 2024-07-30 RX ORDER — HYDROCODONE BITARTRATE AND ACETAMINOPHEN 7.5; 325 MG/1; MG/1
1 TABLET ORAL EVERY 12 HOURS PRN
Qty: 40 TABLET | Refills: 0 | Status: SHIPPED | OUTPATIENT
Start: 2024-07-30

## 2024-07-30 NOTE — TELEPHONE ENCOUNTER
[facility]led sub.  P[facility]as res[facility]   [id number] [place] - [address]

## 2024-08-08 ENCOUNTER — APPOINTMENT (OUTPATIENT)
Dept: NEUROLOGY | Facility: CLINIC | Age: 69
End: 2024-08-08
Payer: COMMERCIAL

## 2024-08-08 VITALS
RESPIRATION RATE: 20 BRPM | SYSTOLIC BLOOD PRESSURE: 134 MMHG | WEIGHT: 214 LBS | HEART RATE: 72 BPM | HEIGHT: 75 IN | TEMPERATURE: 97.3 F | DIASTOLIC BLOOD PRESSURE: 70 MMHG | BODY MASS INDEX: 26.61 KG/M2

## 2024-08-08 DIAGNOSIS — G44.001 INTRACTABLE CLUSTER HEADACHE SYNDROME, UNSPECIFIED CHRONICITY PATTERN: Primary | ICD-10-CM

## 2024-08-08 PROCEDURE — 1159F MED LIST DOCD IN RCRD: CPT | Performed by: PSYCHIATRY & NEUROLOGY

## 2024-08-08 PROCEDURE — 3008F BODY MASS INDEX DOCD: CPT | Performed by: PSYCHIATRY & NEUROLOGY

## 2024-08-08 PROCEDURE — 99213 OFFICE O/P EST LOW 20 MIN: CPT | Performed by: PSYCHIATRY & NEUROLOGY

## 2024-08-08 RX ORDER — TRETINOIN 0.25 MG/G
CREAM TOPICAL
COMMUNITY
Start: 2024-07-25

## 2024-08-08 ASSESSMENT — ENCOUNTER SYMPTOMS
LOSS OF SENSATION IN FEET: 0
OCCASIONAL FEELINGS OF UNSTEADINESS: 1

## 2024-08-08 ASSESSMENT — PATIENT HEALTH QUESTIONNAIRE - PHQ9
2. FEELING DOWN, DEPRESSED OR HOPELESS: NOT AT ALL
SUM OF ALL RESPONSES TO PHQ9 QUESTIONS 1 AND 2: 0
1. LITTLE INTEREST OR PLEASURE IN DOING THINGS: NOT AT ALL

## 2024-08-08 NOTE — PROGRESS NOTES
"    UDS up to date 4-  CSA up to date 11-9-2023        Right hip pain. Seeing orthopedics. Dr. Cliff Mccord at [facility]. Probably hip replacement. Using cane.     Continues monthly Emgality cluster dosing.  Has Been on emgality for 2 + years.   Reports daytime clusters (pain 5/10) that he can get rid of with sumatriptan- 3-4 times per week. Relieves in 15-20 min.   NO REM sleep headaches due to Emgaltiy efficacy.   Has not needed oxygen or hydrocodone during night in quite some time.    3-4 clusters per week during day . Sumatriptan or Vicodin depending on severity or delayed treatment. Usually 1 vicodin will work.  Thinks it is better weather in summer for him.     When not severe pain sumatriptan is effective if treats early. If more severe, will use Vicodin  Trigger is zoom meetings or deadlines or increased anxiety.  Treats within 20-30 min completely. These headaches worsen when the cooler weather starts. Cluster headache behind right eye. Feels like ice pik.    Migraine More holocephalic and throbbing. Rare. Has not had one since February 2024.   Sumatriptan and Vicodin helpful   Associated nausea, light and noise sensitivity.  Triggers are stress, colder weather    No missed work with headaches, clusters or migraines.     Denies depression in past 2 weeks.   Anxiety in \"good\" control.  Citalopram and Abilify controlling anxiety and depression currently. . Xanax every 1-2 weeks PRN  Work stress has been more manageable recently.   Plans to work a couple more years until age 71    Sleep is \"good\".Averages 6 hours per night.     Cardiologist at clinic sees BID recently had stress test for surgery. PCP heard irregular heart beat     OARRS:  No data recorded  I have personally reviewed the OARRS report for Colby Santa. I have considered the risks of abuse, dependence, addiction and diversion    Is the patient prescribed a combination of a benzodiazepine and opioid?  Yes. PCP prescribes for PRN use    Last " Urine Drug Screen / ordered today: No  Last 4- from outside lab  No results found for this or any previous visit (from the past 8760 hour(s)).  Results are as expected.     Clinical rationale for not completing a Urine Drug Screen: completed from outside lab      Controlled Substance Agreement:  Date of the Last Agreement: 10/09/2023  Reviewed Controlled Substance Agreement including but not limited to the benefits, risks, and alternatives to treatment with a Controlled Substance medication(s).    Opioids:  What is the patient's goal of therapy? Head pain control  Is this being achieved with current treatment? yes    I have calculated the patient's Morphine Dose Equivalent (MED):   I have considered referral to Pain Management and/or a specialist, and do not feel it is necessary at this time.    I feel that it is clinically indicated to continue this current medication regimen after consideration of alternative therapies, and other non-opioid treatment.    Opioid Risk Screening:  No data recorded    Pain Assessment:  No data recorded       To review what we discussed today   Assessment/Plan   Problem List Items Addressed This Visit       Cluster headache - Primary     Continue current plan of emgality 300 mg for prevention   Sumatriptan, oxygen and hydrocodone for rescue             Your next appointment with me is 3 months    Thank you for visiting the office of Dr Echo Millan. It was a pleasure working with you today.   [facility] Mon-Thursday  [facility] 5th [facility] Fridays  Our office phone number is [phone]. You can use this number to leave messages for Misti or to schedule or reschedule an appointment or request refills.  If you were seen on Thursday afternoon or Friday our office will call on Monday or Tuesday to reschedule your appointment. If you do not receive a call please call us.   We are also available for messages on my chart. We make every effort to respond to  your concerns by the end of the next business day.

## 2024-08-08 NOTE — ASSESSMENT & PLAN NOTE
Continue current plan of emgality 300 mg for prevention   Sumatriptan, oxygen and hydrocodone for rescue

## 2024-10-01 DIAGNOSIS — G44.029 CHRONIC CLUSTER HEADACHE, NOT INTRACTABLE: ICD-10-CM

## 2024-10-01 DIAGNOSIS — G43.711 INTRACTABLE CHRONIC MIGRAINE WITHOUT AURA AND WITH STATUS MIGRAINOSUS: ICD-10-CM

## 2024-10-01 RX ORDER — GALCANEZUMAB 100 MG/ML
300 INJECTION, SOLUTION SUBCUTANEOUS
Qty: 3 ML | Refills: 3 | Status: SHIPPED | OUTPATIENT
Start: 2024-10-01

## 2024-10-01 RX ORDER — SUMATRIPTAN SUCCINATE 100 MG/1
100 TABLET ORAL AS NEEDED
Qty: 9 TABLET | Refills: 3 | Status: SHIPPED | OUTPATIENT
Start: 2024-10-01

## 2024-10-03 DIAGNOSIS — G43.711 INTRACTABLE CHRONIC MIGRAINE WITHOUT AURA AND WITH STATUS MIGRAINOSUS: ICD-10-CM

## 2024-10-03 RX ORDER — HYDROCODONE BITARTRATE AND ACETAMINOPHEN 7.5; 325 MG/1; MG/1
1 TABLET ORAL EVERY 12 HOURS PRN
Qty: 40 TABLET | Refills: 0 | Status: SHIPPED | OUTPATIENT
Start: 2024-10-03

## 2024-10-30 DIAGNOSIS — G43.711 INTRACTABLE CHRONIC MIGRAINE WITHOUT AURA AND WITH STATUS MIGRAINOSUS: ICD-10-CM

## 2024-10-31 RX ORDER — HYDROCODONE BITARTRATE AND ACETAMINOPHEN 7.5; 325 MG/1; MG/1
1 TABLET ORAL EVERY 12 HOURS PRN
Qty: 40 TABLET | Refills: 0 | Status: SHIPPED | OUTPATIENT
Start: 2024-11-02

## 2024-11-05 ENCOUNTER — APPOINTMENT (OUTPATIENT)
Dept: NEUROLOGY | Facility: CLINIC | Age: 69
End: 2024-11-05
Payer: COMMERCIAL

## 2024-11-05 DIAGNOSIS — G44.019 EPISODIC CLUSTER HEADACHE, NOT INTRACTABLE: Primary | ICD-10-CM

## 2024-11-05 DIAGNOSIS — Z79.899 LONG-TERM USE OF HIGH-RISK MEDICATION: ICD-10-CM

## 2024-11-05 DIAGNOSIS — M54.2 CERVICALGIA OF OCCIPITO-ATLANTO-AXIAL REGION: ICD-10-CM

## 2024-11-05 PROCEDURE — 99214 OFFICE O/P EST MOD 30 MIN: CPT

## 2024-11-05 NOTE — PATIENT INSTRUCTIONS
Dear Colby,    Thank you for attending your virtual visit with [facility]. It was a pleasure caring for you today.  The best way to contact me for medication refills or questions about your care is through [facility].  Otherwise, you can contact the office by phone: [phone].    Nola Rodriguez, RAYMOND-CNP

## 2024-11-05 NOTE — PROGRESS NOTES
"Subjective   HPI  Colby Santa is a 69 y.o. year old male who presents with chief complaint Episodic cluster headaches and Episodic cluster headache, not intractable [G44.019].    Virtual or Telephone Consent:  An interactive audio and video telecommunication system which permits real time communications between the patient (at the originating site) and provider (at the distant site) was utilized to provide this telehealth service.   Verbal consent was requested and obtained from Colby Santa on this date, 11/05/24 for a telehealth visit.     Hip surgery completed in September- doing well.  Believes the anesthesia \"triggered\" an increase in cluster HAs. Other stated triggers include stress/ meetings/ deadlines, and worse with cooler weather.  Colby is having  2-4 cluster headaches per month.   When not severe pain sumatriptan is effective if treats early (treats completely in 20-30 minutes). If more severe, will use Vicodin.  Cluster headache behind right eye. Feels like ice pik.  Has not needed to use oxygen in a very long time. (Was using for REM sleep headaches). No further REM headaches with Emgality cluster dosing.  Verapamil BID- occasional dizziness. (Uses lasix PRN for pedal edema).    Current/ past HA treatments:  Preventive:  Emgality cluster dosing  verapamil  Rescue:  Sumatriptan  Vicodin  xanax    Current Outpatient Medications:     Emgality Syringe 300 mg/3 mL (100 mg/mL x 3) prefilled syringe, INJECT 3 SYRINGES (300 MG) UNDER THE SKIN EVERY 30 (THIRTY) DAYS., Disp: 3 mL, Rfl: 3    SUMAtriptan (Imitrex) 100 mg tablet, TAKE 1 TABLET (100 MG) BY MOUTH IF NEEDED FOR MIGRAINE. TAKE 1 TABLET FOR MIGRAINE RELIEF, MAY REPEAT 2 HOURS LATER. MAXIMUM 200 MG/DAY, Disp: 9 tablet, Rfl: 3    ALPRAZolam (Xanax) 0.25 mg tablet, Take 1 tablet (0.25 mg) by mouth., Disp: , Rfl:     ARIPiprazole (Abilify) 2 mg tablet, TAKE 1 TABLET BY MOUTH EVERYDAY AT BEDTIME, Disp: 30 tablet, Rfl: 8    cholecalciferol (Vitamin " D-3) 25 MCG (1000 UT) capsule, Take 1 capsule (25 mcg) by mouth., Disp: , Rfl:     citalopram (CeleXA) 40 mg tablet, TAKE 1 TABLET BY MOUTH EVERY DAY, Disp: 30 tablet, Rfl: 11    ezetimibe (Zetia) 10 mg tablet, Take 1 tablet (10 mg) by mouth once daily., Disp: , Rfl:     furosemide (Lasix) 20 mg tablet, Take 1 tablet (20 mg) by mouth., Disp: , Rfl:     HYDROcodone-acetaminophen (Norco) 7.5-325 mg tablet, Take 1 tablet by mouth every 12 hours if needed for severe pain (7 - 10). 40 tabs for 30 days. Do not fill before November 2, 2024., Disp: 40 tablet, Rfl: 0    ibuprofen 800 mg tablet, Take 1 tablet (800 mg) by mouth every 8 hours if needed., Disp: , Rfl:     Klor-Con M20 20 mEq ER tablet, Take 1 tablet (20 mEq) by mouth once daily., Disp: , Rfl:     L.acid,ferm,chano,rha-B.bif,long (Controlled Delivery Probiotic) 126 mg (2 billion cell) tablet,delayed and ext.release, Take by mouth., Disp: , Rfl:     rosuvastatin (Crestor) 40 mg tablet, Take 1 tablet (40 mg) by mouth once daily., Disp: , Rfl:     sildenafil (Revatio) 20 mg tablet, Take 3 tablets (60 mg) by mouth., Disp: , Rfl:     tretinoin (Retin-A) 0.025 % cream, APPLY THIN LAYER TO ENTIRE AFFECTED AREA AT NIGHT, Disp: , Rfl:     triamterene-hydrochlorothiazid (Maxzide-25) 37.5-25 mg tablet, Take 1 tablet by mouth once daily., Disp: , Rfl:     verapamil SR (Calan-SR) 240 mg ER tablet, TAKE 1 TABLET BY MOUTH TWICE A DAY, Disp: 180 tablet, Rfl: 3    Past Medical History:   Diagnosis Date    Cluster headache     Headache     Headache, tension-type     Migraine     Other long term (current) drug therapy 02/03/2022    Long term use of drug    Other long term (current) drug therapy 05/06/2021    Controlled substance agreement signed     Past Surgical History:   Procedure Laterality Date    OTHER SURGICAL HISTORY  09/10/2019    No history of surgery     Family History   Problem Relation Name Age of Onset    COPD Mother      Lung cancer Mother      Dementia Father Colby      Parkinsonism Father Colby      Social History     Tobacco Use    Smoking status: Never    Smokeless tobacco: Never   Substance Use Topics    Alcohol use: Yes     Alcohol/week: 7.0 standard drinks of alcohol     Types: 7 Standard drinks or equivalent per week     Social History     Substance and Sexual Activity   Drug Use Not Currently        ROS  As noted in HPI, otherwise all other systems have been reviewed are negative for complaint.     Objective   General Appearance: Colby is well-developed, well-nourished, 69 y.o. year old male, in no acute distress. Makes good eye contact, is alert, interactive, and cooperative. Demonstrates recent & remote memory recall. Subjective information consistent with objective assessment.   *Assessment limited due to virtual visit.     There were no vitals filed for this visit.    Lab Results   Component Value Date     11/05/2020    K 3.8 11/05/2020     11/05/2020    BUN 17 11/05/2020    CREATININE 0.93 11/05/2020    CALCIUM 9.8 11/05/2020    CHOL 193 01/22/2021    HDL 64.0 01/22/2021    TRIG 136 01/22/2021      Neurological Exam  Cranial Nerves  CN III, IV, VI: Extraocular movements intact bilaterally. Normal lids and orbits bilaterally.  CN VII: Full and symmetric facial movement.  CN VIII: Hearing is normal.    Assessment & Plan  Episodic cluster headache, not intractable    Orders:    Drug Screen, Urine With Reflex to Confirmation; Future    Cervicalgia of fenhicpk-[id number]-[id number] region         Long-term use of high-risk medication    Orders:    Drug Screen, Urine With Reflex to Confirmation; Future    UDS last completed 4/25/24- patient will complete prior to this date in 2025.    ASSESSMENT/PLAN:  Will continue current medication regimen.  Patient with concerns for occasional pharmacy shortage of Emgality. Made aware that he may contact the office to see if we have samples in-stock.  Follow up 3 months    Nola Rodriguez, APRN-CNP

## 2024-11-07 ENCOUNTER — APPOINTMENT (OUTPATIENT)
Dept: NEUROLOGY | Facility: CLINIC | Age: 69
End: 2024-11-07
Payer: COMMERCIAL

## 2024-11-26 DIAGNOSIS — G43.711 INTRACTABLE CHRONIC MIGRAINE WITHOUT AURA AND WITH STATUS MIGRAINOSUS: ICD-10-CM

## 2024-11-26 NOTE — TELEPHONE ENCOUNTER
Called to fill his Norco. Not able to car til 12/2/24. But stated he was calling early due to the holiday and figured we would not be in on Thursday and Friday.     Last visit 11/5/24  Next visit 2/6/25  UDS 4/25/24  I do not see an updated CSA. Last one I see is 11/9/23.

## 2024-12-02 RX ORDER — HYDROCODONE BITARTRATE AND ACETAMINOPHEN 7.5; 325 MG/1; MG/1
1 TABLET ORAL EVERY 12 HOURS PRN
Qty: 40 TABLET | Refills: 0 | Status: SHIPPED | OUTPATIENT
Start: 2024-12-02

## 2024-12-26 DIAGNOSIS — G43.711 INTRACTABLE CHRONIC MIGRAINE WITHOUT AURA AND WITH STATUS MIGRAINOSUS: ICD-10-CM

## 2024-12-26 NOTE — TELEPHONE ENCOUNTER
Called to refill his [place]   [facility]     DNFB 1/1/25  Last visit 11/5/24   Next visit 2/6/25   UDS 4/25/24   I do not see an updated CSA. Last one I see is 11/9/23.

## 2024-12-30 RX ORDER — HYDROCODONE BITARTRATE AND ACETAMINOPHEN 7.5; 325 MG/1; MG/1
1 TABLET ORAL EVERY 12 HOURS PRN
Qty: 40 TABLET | Refills: 0 | Status: SHIPPED | OUTPATIENT
Start: 2025-01-01

## 2025-01-28 DIAGNOSIS — G43.711 INTRACTABLE CHRONIC MIGRAINE WITHOUT AURA AND WITH STATUS MIGRAINOSUS: ICD-10-CM

## 2025-01-28 NOTE — TELEPHONE ENCOUNTER
Called to refill his [place] DNFB 1/31/25  Pharmacy is [facility] [address].     Last visit 11/5/24  Next visit 2/6/25  UDS 8/8/24  CSA- only one I see is from 11/9/23    Sent to MD.

## 2025-01-30 RX ORDER — HYDROCODONE BITARTRATE AND ACETAMINOPHEN 7.5; 325 MG/1; MG/1
1 TABLET ORAL EVERY 12 HOURS PRN
Qty: 40 TABLET | Refills: 0 | Status: SHIPPED | OUTPATIENT
Start: 2025-01-31

## 2025-02-06 ENCOUNTER — APPOINTMENT (OUTPATIENT)
Dept: NEUROLOGY | Facility: CLINIC | Age: 70
End: 2025-02-06
Payer: COMMERCIAL

## 2025-02-06 VITALS
SYSTOLIC BLOOD PRESSURE: 120 MMHG | DIASTOLIC BLOOD PRESSURE: 80 MMHG | HEART RATE: 80 BPM | RESPIRATION RATE: 20 BRPM | TEMPERATURE: 97.2 F

## 2025-02-06 DIAGNOSIS — G44.019 EPISODIC CLUSTER HEADACHE, NOT INTRACTABLE: Primary | ICD-10-CM

## 2025-02-06 DIAGNOSIS — Z79.899 LONG-TERM USE OF HIGH-RISK MEDICATION: ICD-10-CM

## 2025-02-06 PROCEDURE — 1159F MED LIST DOCD IN RCRD: CPT | Performed by: PSYCHIATRY & NEUROLOGY

## 2025-02-06 PROCEDURE — 1036F TOBACCO NON-USER: CPT | Performed by: PSYCHIATRY & NEUROLOGY

## 2025-02-06 PROCEDURE — 99213 OFFICE O/P EST LOW 20 MIN: CPT | Performed by: PSYCHIATRY & NEUROLOGY

## 2025-02-06 RX ORDER — ACETAMINOPHEN 500 MG
TABLET ORAL
COMMUNITY
Start: 2024-09-30 | End: 2025-02-06 | Stop reason: ALTCHOICE

## 2025-02-06 RX ORDER — NALOXONE HYDROCHLORIDE 4 MG/.1ML
SPRAY NASAL
COMMUNITY
Start: 2024-09-19

## 2025-02-06 ASSESSMENT — PATIENT HEALTH QUESTIONNAIRE - PHQ9
SUM OF ALL RESPONSES TO PHQ9 QUESTIONS 1 AND 2: 0
2. FEELING DOWN, DEPRESSED OR HOPELESS: NOT AT ALL
1. LITTLE INTEREST OR PLEASURE IN DOING THINGS: NOT AT ALL

## 2025-02-06 NOTE — PROGRESS NOTES
"Increased cluster episodes daily for past 2 weeks. Not nearly as severe as the nighttime REM sleep clusters that he no longer experiences due to Emgality cluster dosing.    Daily clusters have been occurring before a stressful meeting or other work anxiety. A couple days they would recur later in the day which is unusual.     Treats with sumatriptan oral first which will ease if can sense episode coming on and treat very early. If episodes comes on and has been for about 5 minutes will need hydrocodone/apap  Has been needing the hydrocodone/apap daily for past 2 weeks.     Does have oxygen at home. Will use if headache recurs in evening and is helpful.  Does not have at work when most clusters start.     Cluster pain always behind right eye. Feels like stabbing.   Tearing right eye,   Anxiety is a trigger.   Does have PRN Xanax and may take for anxiety before sleep. On average 2 times per week at most.   PCP prescribes.     Sleep is generally \"ok\". Averages 6 hours. Trouble falling asleep at times. Sometimes takes Benadryl    Celexa and Abilify are controlling depression and anxiety        OARRS:  No data recorded  I have personally reviewed the OARRS report for Colby Santa. I have considered the risks of abuse, dependence, addiction and diversion    Is the patient prescribed a combination of a benzodiazepine and opioid?  Yes, I feel it is clincially indicated to continue the medication and have discussed with the patient risks/benefits/alternatives.    Last Urine Drug Screen / ordered today: YES  Last at outside lab scanned into chart. 4- [facility].  Will be due before next visit  No results found for this or any previous visit (from the past 8760 hours).  Results are as expected.         Controlled Substance Agreement:  Date of the Last Agreement: 2/6/2025  Reviewed Controlled Substance Agreement including but not limited to the benefits, risks, and alternatives to treatment with a Controlled " Substance medication(s).    Opioids:  What is the patient's goal of therapy? Head pain control  Is this being achieved with current treatment? yes    I have calculated the patient's Morphine Dose Equivalent (MED):   I have considered referral to Pain Management and/or a specialist, and do not feel it is necessary at this time.    I feel that it is clinically indicated to continue this current medication regimen after consideration of alternative therapies, and other non-opioid treatment.    Opioid Risk Screening:  No data recorded    Pain Assessment:  No data recorded      To review what we discussed today   Assessment/Plan   Problem List Items Addressed This Visit       Cluster headache - Primary     Continue current plan of emgality 300 mg for prevention   Sumatriptan, oxygen and hydrocodone for rescue          Long-term use of high-risk medication    Relevant Orders    Opiate/Opioid/Benzo Prescription Compliance       Your next appointment with me is 3 months with Geri Rodriguez and 6 months with me.     Thank you for visiting the office of Dr Echo Millan. It was a pleasure working with you today.   [facility] Mon-Thursday  [facility] [address] [facility] Fridays  Our office phone number is [phone]. You can use this number to leave messages for Misti or to schedule or reschedule an appointment or request refills.  If you were seen on Thursday afternoon or Friday our office will call on Monday or Tuesday to reschedule your appointment. If you do not receive a call please call us.   We are also available for messages on my chart. We make every effort to respond to your concerns by the end of the next business day.

## 2025-02-26 DIAGNOSIS — G43.711 INTRACTABLE CHRONIC MIGRAINE WITHOUT AURA AND WITH STATUS MIGRAINOSUS: ICD-10-CM

## 2025-02-26 NOTE — TELEPHONE ENCOUNTER
Called to refill his [place]. DNFB 3/2/25  [facility] on [address].     Last visit 2/6/25  Next visit 5/8/25  CSA 2/6/25  UDS 4/23/24

## 2025-02-28 RX ORDER — HYDROCODONE BITARTRATE AND ACETAMINOPHEN 7.5; 325 MG/1; MG/1
1 TABLET ORAL EVERY 12 HOURS PRN
Qty: 40 TABLET | Refills: 0 | Status: SHIPPED | OUTPATIENT
Start: 2025-03-02

## 2025-03-15 DIAGNOSIS — G43.711 INTRACTABLE CHRONIC MIGRAINE WITHOUT AURA AND WITH STATUS MIGRAINOSUS: ICD-10-CM

## 2025-03-17 DIAGNOSIS — F41.9 ANXIETY DISORDER, UNSPECIFIED: ICD-10-CM

## 2025-03-17 RX ORDER — SUMATRIPTAN SUCCINATE 100 MG/1
100 TABLET ORAL AS NEEDED
Qty: 9 TABLET | Refills: 3 | Status: SHIPPED | OUTPATIENT
Start: 2025-03-17

## 2025-03-17 RX ORDER — ARIPIPRAZOLE 2 MG/1
2 TABLET ORAL NIGHTLY
Qty: 30 TABLET | Refills: 8 | Status: SHIPPED | OUTPATIENT
Start: 2025-03-17

## 2025-03-28 DIAGNOSIS — G43.711 INTRACTABLE CHRONIC MIGRAINE WITHOUT AURA AND WITH STATUS MIGRAINOSUS: ICD-10-CM

## 2025-03-28 DIAGNOSIS — G44.029 CHRONIC CLUSTER HEADACHE, NOT INTRACTABLE: ICD-10-CM

## 2025-03-28 NOTE — TELEPHONE ENCOUNTER
Patient call for refill  Last ofv 2/6/25 Next 5/8/25  CSA 2/6/25  Stated is going for UDS within the next few days  Due for fill on 4/1

## 2025-03-31 RX ORDER — GALCANEZUMAB 100 MG/ML
300 INJECTION, SOLUTION SUBCUTANEOUS
Qty: 3 EACH | Refills: 6 | Status: SHIPPED | OUTPATIENT
Start: 2025-03-31

## 2025-03-31 RX ORDER — HYDROCODONE BITARTRATE AND ACETAMINOPHEN 7.5; 325 MG/1; MG/1
1 TABLET ORAL EVERY 12 HOURS PRN
Qty: 40 TABLET | Refills: 0 | Status: SHIPPED | OUTPATIENT
Start: 2025-04-01

## 2025-04-11 DIAGNOSIS — E87.6 LOW POTASSIUM SYNDROME: Primary | ICD-10-CM

## 2025-04-11 LAB
25(OH)D3+25(OH)D2 SERPL-MCNC: 77 NG/ML (ref 30–100)
ALBUMIN SERPL-MCNC: 4.8 G/DL (ref 3.6–5.1)
ALDOLASE SERPL-CCNC: 4 U/L
ALP SERPL-CCNC: 56 U/L (ref 35–144)
ALT SERPL-CCNC: 18 U/L (ref 9–46)
ANION GAP SERPL CALCULATED.4IONS-SCNC: 14 MMOL/L (CALC) (ref 7–17)
AST SERPL-CCNC: 20 U/L (ref 10–35)
BASOPHILS # BLD AUTO: 38 CELLS/UL (ref 0–200)
BASOPHILS NFR BLD AUTO: 0.6 %
BILIRUB SERPL-MCNC: 0.7 MG/DL (ref 0.2–1.2)
BUN SERPL-MCNC: 18 MG/DL (ref 7–25)
CALCIUM SERPL-MCNC: 10.3 MG/DL (ref 8.6–10.3)
CHLORIDE SERPL-SCNC: 92 MMOL/L (ref 98–110)
CK SERPL-CCNC: 72 U/L (ref 19–278)
CO2 SERPL-SCNC: 31 MMOL/L (ref 20–32)
CREAT SERPL-MCNC: 0.95 MG/DL (ref 0.7–1.28)
EGFRCR SERPLBLD CKD-EPI 2021: 86 ML/MIN/1.73M2
EOSINOPHIL # BLD AUTO: 102 CELLS/UL (ref 15–500)
EOSINOPHIL NFR BLD AUTO: 1.6 %
ERYTHROCYTE [DISTWIDTH] IN BLOOD BY AUTOMATED COUNT: 12.7 % (ref 11–15)
EST. AVERAGE GLUCOSE BLD GHB EST-MCNC: 114 MG/DL
EST. AVERAGE GLUCOSE BLD GHB EST-SCNC: 6.3 MMOL/L
GLUCOSE SERPL-MCNC: 102 MG/DL (ref 65–99)
HBA1C MFR BLD: 5.6 % OF TOTAL HGB
HCT VFR BLD AUTO: 44.5 % (ref 38.5–50)
HGB BLD-MCNC: 15.1 G/DL (ref 13.2–17.1)
LYMPHOCYTES # BLD AUTO: 2138 CELLS/UL (ref 850–3900)
LYMPHOCYTES NFR BLD AUTO: 33.4 %
MCH RBC QN AUTO: 32.3 PG (ref 27–33)
MCHC RBC AUTO-ENTMCNC: 33.9 G/DL (ref 32–36)
MCV RBC AUTO: 95.3 FL (ref 80–100)
MONOCYTES # BLD AUTO: 403 CELLS/UL (ref 200–950)
MONOCYTES NFR BLD AUTO: 6.3 %
NEUTROPHILS # BLD AUTO: 3718 CELLS/UL (ref 1500–7800)
NEUTROPHILS NFR BLD AUTO: 58.1 %
PLATELET # BLD AUTO: 197 THOUSAND/UL (ref 140–400)
PMV BLD REES-ECKER: 9.6 FL (ref 7.5–12.5)
POTASSIUM SERPL-SCNC: 3.1 MMOL/L (ref 3.5–5.3)
PROT SERPL-MCNC: 7.7 G/DL (ref 6.1–8.1)
RBC # BLD AUTO: 4.67 MILLION/UL (ref 4.2–5.8)
SODIUM SERPL-SCNC: 137 MMOL/L (ref 135–146)
VIT B1 BLD-SCNC: NORMAL NMOL/L
VIT B12 SERPL-MCNC: 783 PG/ML (ref 200–1100)
WBC # BLD AUTO: 6.4 THOUSAND/UL (ref 3.8–10.8)

## 2025-04-11 RX ORDER — POTASSIUM CHLORIDE 600 MG/1
8 TABLET, FILM COATED, EXTENDED RELEASE ORAL DAILY
Qty: 14 TABLET | Refills: 0 | Status: SHIPPED | OUTPATIENT
Start: 2025-04-11 | End: 2026-04-11

## 2025-04-12 LAB
1OH-MIDAZOLAM UR-MCNC: NORMAL NG/ML
7AMINOCLONAZEPAM UR-MCNC: NORMAL NG/ML
A-OH ALPRAZ UR-MCNC: NORMAL NG/ML
A-OH-TRIAZOLAM UR-MCNC: NORMAL NG/ML
AMPHETAMINES UR QL: NEGATIVE NG/ML
BARBITURATES UR QL: NEGATIVE NG/ML
BZE UR QL: NEGATIVE NG/ML
CODEINE UR-MCNC: NORMAL NG/ML
CREAT UR-MCNC: 60.1 MG/DL
DRUG SCREEN COMMENT UR-IMP: NORMAL
EDDP UR-MCNC: NEGATIVE NG/ML
FENTANYL UR-MCNC: NORMAL NG/ML
HYDROCODONE UR-MCNC: NORMAL UG/ML
HYDROMORPHONE UR-MCNC: NORMAL NG/ML
LORAZEPAM UR-MCNC: NORMAL NG/ML
METHADONE UR-MCNC: NEGATIVE NG/ML
MORPHINE UR-MCNC: NORMAL NG/ML
NORDIAZEPAM UR-MCNC: NORMAL NG/ML
NORFENTANYL UR-MCNC: NORMAL NG/ML
NORHYDROCODONE UR CFM-MCNC: NORMAL NG/ML
NOROXYCODONE UR CFM-MCNC: NORMAL NG/ML
NORTRAMADOL UR-MCNC: NORMAL UG/ML
OH-ETHYLFLURAZ UR-MCNC: NORMAL NG/ML
OXAZEPAM UR-MCNC: NORMAL UG/ML
OXIDANTS UR QL: NEGATIVE MCG/ML
OXYCODONE UR CFM-MCNC: NORMAL NG/ML
OXYMORPHONE UR CFM-MCNC: NORMAL NG/ML
PCP UR QL: NEGATIVE NG/ML
PH UR: 5.7 [PH] (ref 4.5–9)
QUEST 6 ACETYLMORPHINE: NORMAL
QUEST NOTES AND COMMENTS: NORMAL
QUEST PATIENT HISTORICAL REPORT: NORMAL
QUEST ZOLPIDEM: NORMAL
TEMAZEPAM UR-MCNC: NORMAL NG/ML
THC UR QL: NORMAL NG/ML
THC UR-MCNC: NORMAL NG/ML
TRAMADOL UR-MCNC: NORMAL UG/ML
ZOLPIDEM PHENYL-4-CARB UR CFM-MCNC: NORMAL NG/ML

## 2025-04-13 LAB
1OH-MIDAZOLAM UR-MCNC: NEGATIVE NG/ML
7AMINOCLONAZEPAM UR-MCNC: NEGATIVE NG/ML
A-OH ALPRAZ UR-MCNC: NEGATIVE NG/ML
A-OH-TRIAZOLAM UR-MCNC: NEGATIVE NG/ML
AMPHETAMINES UR QL: NEGATIVE NG/ML
BARBITURATES UR QL: NEGATIVE NG/ML
BZE UR QL: NEGATIVE NG/ML
CODEINE UR-MCNC: NEGATIVE NG/ML
CREAT UR-MCNC: 60.1 MG/DL
DRUG SCREEN COMMENT UR-IMP: ABNORMAL
EDDP UR-MCNC: NEGATIVE NG/ML
FENTANYL UR-MCNC: NEGATIVE NG/ML
HYDROCODONE UR-MCNC: 198 NG/ML
HYDROMORPHONE UR-MCNC: 329 NG/ML
LORAZEPAM UR-MCNC: NEGATIVE NG/ML
METHADONE UR-MCNC: NEGATIVE NG/ML
MORPHINE UR-MCNC: NEGATIVE NG/ML
NORDIAZEPAM UR-MCNC: NEGATIVE NG/ML
NORFENTANYL UR-MCNC: NEGATIVE NG/ML
NORHYDROCODONE UR CFM-MCNC: 566 NG/ML
NOROXYCODONE UR CFM-MCNC: 114 NG/ML
NORTRAMADOL UR-MCNC: NEGATIVE NG/ML
OH-ETHYLFLURAZ UR-MCNC: NEGATIVE NG/ML
OXAZEPAM UR-MCNC: NEGATIVE NG/ML
OXIDANTS UR QL: NEGATIVE MCG/ML
OXYCODONE UR CFM-MCNC: NEGATIVE NG/ML
OXYMORPHONE UR CFM-MCNC: 69 NG/ML
PCP UR QL: NEGATIVE NG/ML
PH UR: 5.7 [PH] (ref 4.5–9)
QUEST 6 ACETYLMORPHINE: NEGATIVE NG/ML
QUEST NOTES AND COMMENTS: ABNORMAL
QUEST ZOLPIDEM: NEGATIVE NG/ML
TEMAZEPAM UR-MCNC: NEGATIVE NG/ML
THC UR QL: POSITIVE NG/ML
THC UR-MCNC: 37 NG/ML
TRAMADOL UR-MCNC: NEGATIVE NG/ML
ZOLPIDEM PHENYL-4-CARB UR CFM-MCNC: NEGATIVE NG/ML

## 2025-04-18 ENCOUNTER — TELEPHONE (OUTPATIENT)
Dept: NEUROLOGY | Facility: CLINIC | Age: 70
End: 2025-04-18
Payer: COMMERCIAL

## 2025-04-18 DIAGNOSIS — Z79.899 LONG-TERM USE OF HIGH-RISK MEDICATION: ICD-10-CM

## 2025-04-18 LAB
25(OH)D3+25(OH)D2 SERPL-MCNC: 77 NG/ML (ref 30–100)
ALBUMIN SERPL-MCNC: 4.8 G/DL (ref 3.6–5.1)
ALDOLASE SERPL-CCNC: 4 U/L
ALP SERPL-CCNC: 56 U/L (ref 35–144)
ALT SERPL-CCNC: 18 U/L (ref 9–46)
ANION GAP SERPL CALCULATED.4IONS-SCNC: 14 MMOL/L (CALC) (ref 7–17)
AST SERPL-CCNC: 20 U/L (ref 10–35)
BASOPHILS # BLD AUTO: 38 CELLS/UL (ref 0–200)
BASOPHILS NFR BLD AUTO: 0.6 %
BILIRUB SERPL-MCNC: 0.7 MG/DL (ref 0.2–1.2)
BUN SERPL-MCNC: 18 MG/DL (ref 7–25)
CALCIUM SERPL-MCNC: 10.3 MG/DL (ref 8.6–10.3)
CHLORIDE SERPL-SCNC: 92 MMOL/L (ref 98–110)
CK SERPL-CCNC: 72 U/L (ref 19–278)
CO2 SERPL-SCNC: 31 MMOL/L (ref 20–32)
CREAT SERPL-MCNC: 0.95 MG/DL (ref 0.7–1.28)
EGFRCR SERPLBLD CKD-EPI 2021: 86 ML/MIN/1.73M2
EOSINOPHIL # BLD AUTO: 102 CELLS/UL (ref 15–500)
EOSINOPHIL NFR BLD AUTO: 1.6 %
ERYTHROCYTE [DISTWIDTH] IN BLOOD BY AUTOMATED COUNT: 12.7 % (ref 11–15)
EST. AVERAGE GLUCOSE BLD GHB EST-MCNC: 114 MG/DL
EST. AVERAGE GLUCOSE BLD GHB EST-SCNC: 6.3 MMOL/L
GLUCOSE SERPL-MCNC: 102 MG/DL (ref 65–99)
HBA1C MFR BLD: 5.6 % OF TOTAL HGB
HCT VFR BLD AUTO: 44.5 % (ref 38.5–50)
HGB BLD-MCNC: 15.1 G/DL (ref 13.2–17.1)
LYMPHOCYTES # BLD AUTO: 2138 CELLS/UL (ref 850–3900)
LYMPHOCYTES NFR BLD AUTO: 33.4 %
MCH RBC QN AUTO: 32.3 PG (ref 27–33)
MCHC RBC AUTO-ENTMCNC: 33.9 G/DL (ref 32–36)
MCV RBC AUTO: 95.3 FL (ref 80–100)
MONOCYTES # BLD AUTO: 403 CELLS/UL (ref 200–950)
MONOCYTES NFR BLD AUTO: 6.3 %
NEUTROPHILS # BLD AUTO: 3718 CELLS/UL (ref 1500–7800)
NEUTROPHILS NFR BLD AUTO: 58.1 %
PLATELET # BLD AUTO: 197 THOUSAND/UL (ref 140–400)
PMV BLD REES-ECKER: 9.6 FL (ref 7.5–12.5)
POTASSIUM SERPL-SCNC: 3.1 MMOL/L (ref 3.5–5.3)
PROT SERPL-MCNC: 7.7 G/DL (ref 6.1–8.1)
RBC # BLD AUTO: 4.67 MILLION/UL (ref 4.2–5.8)
SODIUM SERPL-SCNC: 137 MMOL/L (ref 135–146)
VIT B1 BLD-SCNC: 196 NMOL/L (ref 78–185)
VIT B12 SERPL-MCNC: 783 PG/ML (ref 200–1100)
WBC # BLD AUTO: 6.4 THOUSAND/UL (ref 3.8–10.8)

## 2025-05-06 ENCOUNTER — APPOINTMENT (OUTPATIENT)
Dept: NEUROLOGY | Facility: CLINIC | Age: 70
End: 2025-05-06
Payer: COMMERCIAL

## 2025-05-06 DIAGNOSIS — G43.711 INTRACTABLE CHRONIC MIGRAINE WITHOUT AURA AND WITH STATUS MIGRAINOSUS: ICD-10-CM

## 2025-05-06 DIAGNOSIS — Z79.899 LONG-TERM USE OF HIGH-RISK MEDICATION: Primary | ICD-10-CM

## 2025-05-06 DIAGNOSIS — G44.019 EPISODIC CLUSTER HEADACHE, NOT INTRACTABLE: ICD-10-CM

## 2025-05-06 PROCEDURE — 99213 OFFICE O/P EST LOW 20 MIN: CPT

## 2025-05-06 NOTE — PATIENT INSTRUCTIONS
Colby    Thank you for attending your virtual visit today with [facility]. It was a pleasure caring for you today. The best way to contact me for medication refills or questions about your care is through [facility]. Otherwise, you can contact the office by phone: [phone].    Nola Rordiguez, RAYMOND-CNP

## 2025-05-06 NOTE — PROGRESS NOTES
Chief Complaint   Patient presents with    Migraine     Virtual or Telephone Consent    An interactive audio and video telecommunication system which permits real time communications between the patient (at the originating site) and provider (at the distant site) was utilized to provide this telehealth service.     Verbal consent was requested and obtained from Colby Santa on this date 05/06/25 for a telehealth visit and the patient's location was confirmed at the time of the visit.    Subjective   HPI  Colby Santa is a 70 y.o. year old male who presents with chief complaint Episodic cluster headaches and Long-term use of high-risk medication [Z79.899], episodic cluster HA, and intractable chronic migraine without aura.    Colby states he recently had an increase in headaches- recently had 3 to 4 episodes per week. Does not go 5 to 7 days without HA. Since starting Emgality, has not had REM sleep HAs.     When not severe pain sumatriptan is effective if treats early (treats completely in 20-30 minutes). If more severe, will use Vicodin.  Cluster headaches start behind right eye. Feels like sharp ice pick pain. States that he has not needed to use oxygen in a long time- has not had REM headaches since starting Emgality cluster dosing.  Vicodin- will repeat urine drug screen. Patient states understanding of controlled substance policy    Current/ past HA treatments:  Preventive:  Emgality cluster dosing  Verapamil    Rescue:  Sumatriptan  Vicodin  xanax    Current Outpatient Medications:     ARIPiprazole (Abilify) 2 mg tablet, TAKE 1 TABLET BY MOUTH EVERYDAY AT BEDTIME, Disp: 30 tablet, Rfl: 8    cholecalciferol (Vitamin D-3) 25 MCG (1000 UT) capsule, Take 1 capsule (25 mcg) by mouth., Disp: , Rfl:     citalopram (CeleXA) 40 mg tablet, TAKE 1 TABLET BY MOUTH EVERY DAY, Disp: 30 tablet, Rfl: 11    Emgality Syringe 300 mg/3 mL (100 mg/mL x 3) prefilled syringe, INJECT 3 SYRINGES (300 MG) UNDER THE SKIN EVERY  30 (THIRTY) DAYS., Disp: 3 each, Rfl: 6    ezetimibe (Zetia) 10 mg tablet, Take 1 tablet (10 mg) by mouth once daily., Disp: , Rfl:     furosemide (Lasix) 20 mg tablet, Take 1 tablet (20 mg) by mouth. (Patient taking differently: Take 1 tablet (20 mg) by mouth if needed. As needed pedal edema), Disp: , Rfl:     HYDROcodone-acetaminophen (Norco) 7.5-325 mg tablet, Take 1 tablet by mouth every 12 hours if needed for severe pain (7 - 10). 40 tabs for 30 days. Do not fill before April 1, 2025., Disp: 40 tablet, Rfl: 0    ibuprofen 800 mg tablet, Take 1 tablet (800 mg) by mouth every 8 hours if needed., Disp: , Rfl:     Klor-Con M20 20 mEq ER tablet, Take 1 tablet (20 mEq) by mouth once daily., Disp: , Rfl:     L.acid,ferm,chano,rha-B.bif,long (Controlled Delivery Probiotic) 126 mg (2 billion cell) tablet,delayed and ext.release, Take by mouth., Disp: , Rfl:     naloxone (Narcan) 4 mg/0.1 mL nasal spray, Use 1 spray in one nostril as needed for overdose. May repeat every 2 to 3 min in alternating nostrils until medical assistance is available (Patient taking differently: if needed.), Disp: , Rfl:     potassium chloride CR (Klor-Con) 8 mEq ER tablet, Take 1 tablet (8 mEq) by mouth once daily. Do not crush, chew, or split., Disp: 14 tablet, Rfl: 0    rosuvastatin (Crestor) 40 mg tablet, Take 1 tablet (40 mg) by mouth once daily., Disp: , Rfl:     SUMAtriptan (Imitrex) 100 mg tablet, TAKE 1 TABLET (100 MG) BY MOUTH IF NEEDED FOR MIGRAINE. TAKE 1 TABLET FOR MIGRAINE RELIEF, MAY REPEAT 2 HOURS LATER. MAXIMUM 200 MG/DAY, Disp: 9 tablet, Rfl: 3    tretinoin (Retin-A) 0.025 % cream, APPLY THIN LAYER TO ENTIRE AFFECTED AREA AT NIGHT, Disp: , Rfl:     triamterene-hydrochlorothiazid (Maxzide-25) 37.5-25 mg tablet, Take 1 tablet by mouth once daily., Disp: , Rfl:     verapamil SR (Calan-SR) 240 mg ER tablet, TAKE 1 TABLET BY MOUTH TWICE A DAY, Disp: 180 tablet, Rfl: 3    Social History     Tobacco Use    Smoking status: Former      Types: Cigars     Passive exposure: Past    Smokeless tobacco: Never   Substance Use Topics    Alcohol use: Not Currently     Alcohol/week: 1.0 standard drink of alcohol     Types: 1 Standard drinks or equivalent per week     Comment: no longer drinking regularly, only on occasion     Social History     Substance and Sexual Activity   Drug Use Not Currently    Types: Marijuana    Comment: medicinal- no longer using      ROS  As noted in HPI, otherwise all other systems have been reviewed are negative for complaint.     Objective   General Appearance: Colby is well-developed, well-nourished, 70 y.o. year old male, in no acute distress. Makes good eye contact, is alert, interactive, and cooperative. Demonstrates recent & remote memory recall. Subjective information consistent with objective assessment. *Assessment limited due to virtual visit.     Lab Results   Component Value Date    WBC 6.4 04/10/2025    RBC 4.67 04/10/2025    HGB 15.1 04/10/2025    HCT 44.5 04/10/2025     04/10/2025     04/10/2025    K 3.1 (L) 04/10/2025    CL 92 (L) 04/10/2025    BUN 18 04/10/2025    CREATININE 0.95 04/10/2025    EGFR 86 04/10/2025    CALCIUM 10.3 04/10/2025    ALKPHOS 56 04/10/2025    AST 20 04/10/2025    ALT 18 04/10/2025    CXPYSLNN30 783 04/10/2025    VITD25 77 04/10/2025    HGBA1C 5.6 04/10/2025    CHOL 193 01/22/2021    HDL 64.0 01/22/2021    TRIG 136 01/22/2021     Neurological Exam  Cranial Nerves  CN III, IV, VI: Extraocular movements intact bilaterally. Normal lids and orbits bilaterally.  CN VII: Full and symmetric facial movement.  CN VIII: Hearing is normal.    Chief Complaint   Patient presents with    Migraine     OARRS:  I have personally reviewed the OARRS report for Colby Santa. I have considered the risks of abuse, dependence, addiction and diversion.    Current Medications[1]     Is the patient prescribed a combination of a benzodiazepine and opioid?  Yes, I feel it is clincially indicated to  continue the medication and have discussed with the patient risks/benefits/alternatives.    Urine Drug Screen ordered today: No    Last Urine Drug Screen:   Recent Results (from the past 8760 hours)   Opiate/Opioid/Benzo Prescription Compliance    Collection Time: 04/10/25  2:22 PM   Result Value Ref Range    Creatinine 60.1 > or = 20.0 mg/dL    pH 5.7 4.5 - 9.0    Oxidant NEGATIVE <200 mcg/mL    Amphetamines NEGATIVE <500 ng/mL    Barbiturates NEGATIVE <300 ng/mL    Cocaine Metabolite NEGATIVE <150 ng/mL    Marijuana Metabolite POSITIVE (A) <20 ng/mL    medMATCH Marijuana Metab      Marijuana Metabolite 37 (H) <5 ng/mL    medMATCH Marijuana Metab      Marijuana Comments      Phencyclidine NEGATIVE <25 ng/mL    Alphahydroxyalprazolam NEGATIVE <25 ng/mL    Alphahydroxymidazolam NEGATIVE <50 ng/mL    Alphahydroxytriazolam NEGATIVE <50 ng/mL    Aminoclonazepam NEGATIVE <25 ng/mL    Hydroxyethylflurazepam NEGATIVE <50 ng/mL    Lorazepam NEGATIVE <50 ng/mL    Nordiazepam NEGATIVE <50 ng/mL    Oxazepam NEGATIVE <50 ng/mL    Temazepam NEGATIVE <50 ng/mL    Benzodiazepines Comments      Fentanyl NEGATIVE <0.5 ng/mL    Norfentanyl NEGATIVE <0.5 ng/mL    Fentanyl Comments      6 Acetylmorphine NEGATIVE <10 ng/mL    Heroin Metab Comments      EDDP NEGATIVE <100 ng/mL    Methadone NEGATIVE <100 ng/mL    Methadone Comments      Codeine NEGATIVE <50 ng/mL    Hydrocodone 198 (H) <50 ng/mL    Hydromorphone 329 (H) <50 ng/mL    Morphine NEGATIVE <50 ng/mL    Norhydrocodone 566 (H) <50 ng/mL    Opiates Comments      Noroxycodone 114 (H) <50 ng/mL    Oxycodone NEGATIVE <50 ng/mL    Oxymorphone 69 (H) <50 ng/mL    Oxycodone Comments      Desmethyltramadol NEGATIVE <100 ng/mL    Tramadol NEGATIVE <100 ng/mL    Tramadol Comments      Zolpidem NEGATIVE <5 ng/mL    Zolpidem Metabolite NEGATIVE <5 ng/mL    Zolpidem Comments      Notes and Comments       Results unexpected. Patient will repeat urine drug screen.    Controlled Substance  Agreement:  Date of the Last Agreement: 2/6/2025  Reviewed Controlled Substance Agreement including but not limited to the benefits, risks, and alternatives to treatment with a Controlled Substance medication(s).    Opioids:  What is the patient's goal of therapy? Head pain control   Is this being achieved with current treatment? yes    I have calculated the patient's Morphine Dose Equivalent (MED):   I have considered referral to Pain Management and/or a specialist, and do not feel it is necessary at this time.    I feel that it is clinically indicated to continue this current medication regimen after consideration of alternative therapies, and other non-opioid treatment.    Opioid Risk Screening:  No data recorded    Pain Assessment:  No data recorded    I have personally reviewed the OARRS report for Colby on 5/9/25.  I have considered the risks of abuse, dependence, addition, and diversion.      I have reviewed the controlled substance agreement with patient/caregiver on 5/9/25.  Assessment & Plan  Long-term use of high-risk medication         Episodic cluster headache, not intractable         Intractable chronic migraine without aura and with status migrainosus           ASSESSMENT/PLAN:  Continue current medication regimen.  Follow up in 3 months/ 90 days    Nola Rodriguez, APRN-CNP    I personally spent 21 minutes today, exclusive of procedures, providing care for this patient, including preparation, face to face time, documentation and other services such as review of medical records, diagnostic result, patient education, counseling, coordination of care as specified in the encounter.          [1]   Current Outpatient Medications:     ARIPiprazole (Abilify) 2 mg tablet, TAKE 1 TABLET BY MOUTH EVERYDAY AT BEDTIME, Disp: 30 tablet, Rfl: 8    cholecalciferol (Vitamin D-3) 25 MCG (1000 UT) capsule, Take 1 capsule (25 mcg) by mouth., Disp: , Rfl:     citalopram (CeleXA) 40 mg tablet, TAKE 1 TABLET BY MOUTH  EVERY DAY, Disp: 30 tablet, Rfl: 11    Emgality Syringe 300 mg/3 mL (100 mg/mL x 3) prefilled syringe, INJECT 3 SYRINGES (300 MG) UNDER THE SKIN EVERY 30 (THIRTY) DAYS., Disp: 3 each, Rfl: 6    ezetimibe (Zetia) 10 mg tablet, Take 1 tablet (10 mg) by mouth once daily., Disp: , Rfl:     furosemide (Lasix) 20 mg tablet, Take 1 tablet (20 mg) by mouth. (Patient taking differently: Take 1 tablet (20 mg) by mouth if needed. As needed pedal edema), Disp: , Rfl:     HYDROcodone-acetaminophen (Norco) 7.5-325 mg tablet, Take 1 tablet by mouth every 12 hours if needed for severe pain (7 - 10). 40 tabs for 30 days. Do not fill before April 1, 2025., Disp: 40 tablet, Rfl: 0    ibuprofen 800 mg tablet, Take 1 tablet (800 mg) by mouth every 8 hours if needed., Disp: , Rfl:     Klor-Con M20 20 mEq ER tablet, Take 1 tablet (20 mEq) by mouth once daily., Disp: , Rfl:     L.acid,ferm,chano,rha-B.bif,long (Controlled Delivery Probiotic) 126 mg (2 billion cell) tablet,delayed and ext.release, Take by mouth., Disp: , Rfl:     naloxone (Narcan) 4 mg/0.1 mL nasal spray, Use 1 spray in one nostril as needed for overdose. May repeat every 2 to 3 min in alternating nostrils until medical assistance is available (Patient taking differently: if needed.), Disp: , Rfl:     potassium chloride CR (Klor-Con) 8 mEq ER tablet, Take 1 tablet (8 mEq) by mouth once daily. Do not crush, chew, or split., Disp: 14 tablet, Rfl: 0    rosuvastatin (Crestor) 40 mg tablet, Take 1 tablet (40 mg) by mouth once daily., Disp: , Rfl:     SUMAtriptan (Imitrex) 100 mg tablet, TAKE 1 TABLET (100 MG) BY MOUTH IF NEEDED FOR MIGRAINE. TAKE 1 TABLET FOR MIGRAINE RELIEF, MAY REPEAT 2 HOURS LATER. MAXIMUM 200 MG/DAY, Disp: 9 tablet, Rfl: 3    tretinoin (Retin-A) 0.025 % cream, APPLY THIN LAYER TO ENTIRE AFFECTED AREA AT NIGHT, Disp: , Rfl:     triamterene-hydrochlorothiazid (Maxzide-25) 37.5-25 mg tablet, Take 1 tablet by mouth once daily., Disp: , Rfl:     verapamil SR  (Calan-SR) 240 mg ER tablet, TAKE 1 TABLET BY MOUTH TWICE A DAY, Disp: 180 tablet, Rfl: 3

## 2025-05-08 ENCOUNTER — APPOINTMENT (OUTPATIENT)
Dept: NEUROLOGY | Facility: CLINIC | Age: 70
End: 2025-05-08
Payer: COMMERCIAL

## 2025-05-10 LAB
AMPHETAMINES UR QL: NEGATIVE NG/ML
BARBITURATES UR QL: NEGATIVE NG/ML
BENZODIAZ UR QL: NEGATIVE NG/ML
BZE UR QL: NEGATIVE NG/ML
CREAT UR-MCNC: 83.2 MG/DL
METHADONE UR QL: NEGATIVE NG/ML
OPIATES UR QL: NEGATIVE NG/ML
OXIDANTS UR QL: NEGATIVE MCG/ML
OXYCODONE UR QL: NEGATIVE NG/ML
PCP UR QL: NEGATIVE NG/ML
PH UR: 5.7 [PH] (ref 4.5–9)
QUEST NOTES AND COMMENTS: NORMAL
THC UR QL: NEGATIVE NG/ML

## 2025-05-13 DIAGNOSIS — G43.711 INTRACTABLE CHRONIC MIGRAINE WITHOUT AURA AND WITH STATUS MIGRAINOSUS: ICD-10-CM

## 2025-05-13 RX ORDER — HYDROCODONE BITARTRATE AND ACETAMINOPHEN 7.5; 325 MG/1; MG/1
1 TABLET ORAL EVERY 12 HOURS PRN
Qty: 40 TABLET | Refills: 0 | Status: SHIPPED | OUTPATIENT
Start: 2025-05-13

## 2025-06-09 DIAGNOSIS — G43.711 INTRACTABLE CHRONIC MIGRAINE WITHOUT AURA AND WITH STATUS MIGRAINOSUS: ICD-10-CM

## 2025-06-09 DIAGNOSIS — G44.009 CLUSTER HEADACHE SYNDROME, UNSPECIFIED, NOT INTRACTABLE: ICD-10-CM

## 2025-06-09 RX ORDER — ONDANSETRON 4 MG/1
4 TABLET, FILM COATED ORAL EVERY 8 HOURS PRN
COMMUNITY
Start: 2025-03-18

## 2025-06-09 RX ORDER — ALPRAZOLAM 0.25 MG/1
0.25 TABLET ORAL
COMMUNITY
Start: 2025-04-09 | End: 2025-07-08

## 2025-06-10 RX ORDER — VERAPAMIL HCL 240 MG
240 TABLET, EXTENDED RELEASE ORAL 2 TIMES DAILY
Qty: 180 TABLET | Refills: 3 | Status: SHIPPED | OUTPATIENT
Start: 2025-06-10

## 2025-06-10 RX ORDER — HYDROCODONE BITARTRATE AND ACETAMINOPHEN 7.5; 325 MG/1; MG/1
1 TABLET ORAL EVERY 12 HOURS PRN
Qty: 40 TABLET | Refills: 0 | Status: SHIPPED | OUTPATIENT
Start: 2025-06-12

## 2025-06-10 RX ORDER — CITALOPRAM 40 MG/1
40 TABLET ORAL DAILY
Qty: 30 TABLET | Refills: 11 | Status: SHIPPED | OUTPATIENT
Start: 2025-06-10

## 2025-06-28 DIAGNOSIS — G44.029 CHRONIC CLUSTER HEADACHE, NOT INTRACTABLE: ICD-10-CM

## 2025-06-30 RX ORDER — GALCANEZUMAB 100 MG/ML
300 INJECTION, SOLUTION SUBCUTANEOUS
Qty: 1 EACH | Refills: 3 | Status: SHIPPED | OUTPATIENT
Start: 2025-06-30

## 2025-07-09 DIAGNOSIS — G43.711 INTRACTABLE CHRONIC MIGRAINE WITHOUT AURA AND WITH STATUS MIGRAINOSUS: ICD-10-CM

## 2025-07-09 RX ORDER — HYDROCODONE BITARTRATE AND ACETAMINOPHEN 7.5; 325 MG/1; MG/1
1 TABLET ORAL EVERY 12 HOURS PRN
Qty: 40 TABLET | Refills: 0 | Status: SHIPPED | OUTPATIENT
Start: 2025-07-11

## 2025-08-05 ENCOUNTER — APPOINTMENT (OUTPATIENT)
Dept: NEUROLOGY | Facility: CLINIC | Age: 70
End: 2025-08-05
Payer: COMMERCIAL

## 2025-08-07 ENCOUNTER — APPOINTMENT (OUTPATIENT)
Dept: NEUROLOGY | Facility: CLINIC | Age: 70
End: 2025-08-07
Payer: COMMERCIAL

## 2025-08-07 VITALS
HEIGHT: 75 IN | DIASTOLIC BLOOD PRESSURE: 76 MMHG | HEART RATE: 80 BPM | RESPIRATION RATE: 20 BRPM | TEMPERATURE: 97.7 F | BODY MASS INDEX: 27.35 KG/M2 | WEIGHT: 220 LBS | SYSTOLIC BLOOD PRESSURE: 146 MMHG

## 2025-08-07 DIAGNOSIS — G43.711 INTRACTABLE CHRONIC MIGRAINE WITHOUT AURA AND WITH STATUS MIGRAINOSUS: ICD-10-CM

## 2025-08-07 DIAGNOSIS — G44.001 INTRACTABLE CLUSTER HEADACHE SYNDROME, UNSPECIFIED CHRONICITY PATTERN: Primary | ICD-10-CM

## 2025-08-07 PROCEDURE — 3008F BODY MASS INDEX DOCD: CPT | Performed by: PSYCHIATRY & NEUROLOGY

## 2025-08-07 PROCEDURE — 1159F MED LIST DOCD IN RCRD: CPT | Performed by: PSYCHIATRY & NEUROLOGY

## 2025-08-07 PROCEDURE — 99213 OFFICE O/P EST LOW 20 MIN: CPT | Performed by: PSYCHIATRY & NEUROLOGY

## 2025-08-07 PROCEDURE — 1126F AMNT PAIN NOTED NONE PRSNT: CPT | Performed by: PSYCHIATRY & NEUROLOGY

## 2025-08-07 RX ORDER — SYRINGE-NEEDLE,INSULIN,0.5 ML 28GX1/2"
1 SYRINGE, EMPTY DISPOSABLE MISCELLANEOUS
COMMUNITY
Start: 2025-06-26

## 2025-08-07 RX ORDER — HYDROCODONE BITARTRATE AND ACETAMINOPHEN 7.5; 325 MG/1; MG/1
1 TABLET ORAL EVERY 12 HOURS PRN
Qty: 40 TABLET | Refills: 0 | Status: SHIPPED | OUTPATIENT
Start: 2025-08-10

## 2025-08-07 RX ORDER — ALPRAZOLAM 0.25 MG/1
TABLET ORAL
COMMUNITY
Start: 2025-07-24

## 2025-08-07 ASSESSMENT — PATIENT HEALTH QUESTIONNAIRE - PHQ9
SUM OF ALL RESPONSES TO PHQ9 QUESTIONS 1 AND 2: 0
1. LITTLE INTEREST OR PLEASURE IN DOING THINGS: NOT AT ALL
2. FEELING DOWN, DEPRESSED OR HOPELESS: NOT AT ALL

## 2025-08-07 ASSESSMENT — PAIN SCALES - GENERAL: PAINLEVEL_OUTOF10: 0-NO PAIN

## 2025-08-07 NOTE — PROGRESS NOTES
"UDS and CSA up to date    Endorsing some low back pain. Thinks as a results of gait change after hip replacement. Appointment scheduled in September with ortho. Dr Conor Hansen    Denies depression currently. Well controlled . Endorses anxiety with work.     Clusters have been \"really good\". Averaging 2-3 clusters per week but not severe and treating well with sumatriptan.  Taking hydrocodone 1-3 pills for headache that isn't a cluster to keep a headache from progressing. Does fill 40 every 30 days. Does not use everyday   Continues Emgality 300 mg monthly dosing and verapamil for cluster prevention.   Migraine like headache from stress that is at vertex. Uses hydrocodone for these. These headaches occur 2 x a month   Cluster pain behind right eye. Aura feeling of bruising around eye when one is coming on.   Feels like stabbing pain  Associated tearing right eye  Anxiety is a trigger.   For the most part they are occurring around lunch time.   None during night any longer with Emgality prevention  Does still have oxygen to use if needed and works well.     Alprazolam from PCP for anxiety. Especially triggered by work stress.     Sleep is generally \"ok\". Averages 6-7 hours. Trouble falling asleep at times. Sometimes takes Benadryl     Denies depression currently. Well controlled with celexa and Abilify . Endorses anxiety with work.     OARRS:  No data recorded  I have personally reviewed the OARRS report for Colby Santa. I have considered the risks of abuse, dependence, addiction and diversion    Is the patient prescribed a combination of a benzodiazepine and opioid?  Yes. 2 different processes going on.     Last Urine Drug Screen / ordered today: No  Recent Results (from the past 8760 hours)   Opiate/Opioid/Benzo Prescription Compliance    Collection Time: 04/10/25  2:22 PM   Result Value Ref Range    Creatinine 60.1 > or = 20.0 mg/dL    pH 5.7 4.5 - 9.0    Oxidant NEGATIVE <200 mcg/mL    Amphetamines NEGATIVE " <500 ng/mL    Barbiturates NEGATIVE <300 ng/mL    Cocaine Metabolite NEGATIVE <150 ng/mL    Marijuana Metabolite POSITIVE (A) <20 ng/mL    medMATCH Marijuana Metab      Marijuana Metabolite 37 (H) <5 ng/mL    medMATCH Marijuana Metab      Marijuana Comments      Phencyclidine NEGATIVE <25 ng/mL    Alphahydroxyalprazolam NEGATIVE <25 ng/mL    Alphahydroxymidazolam NEGATIVE <50 ng/mL    Alphahydroxytriazolam NEGATIVE <50 ng/mL    Aminoclonazepam NEGATIVE <25 ng/mL    Hydroxyethylflurazepam NEGATIVE <50 ng/mL    Lorazepam NEGATIVE <50 ng/mL    Nordiazepam NEGATIVE <50 ng/mL    Oxazepam NEGATIVE <50 ng/mL    Temazepam NEGATIVE <50 ng/mL    Benzodiazepines Comments      Fentanyl NEGATIVE <0.5 ng/mL    Norfentanyl NEGATIVE <0.5 ng/mL    Fentanyl Comments      6 Acetylmorphine NEGATIVE <10 ng/mL    Heroin Metab Comments      EDDP NEGATIVE <100 ng/mL    Methadone NEGATIVE <100 ng/mL    Methadone Comments      Codeine NEGATIVE <50 ng/mL    Hydrocodone 198 (H) <50 ng/mL    Hydromorphone 329 (H) <50 ng/mL    Morphine NEGATIVE <50 ng/mL    Norhydrocodone 566 (H) <50 ng/mL    Opiates Comments      Noroxycodone 114 (H) <50 ng/mL    Oxycodone NEGATIVE <50 ng/mL    Oxymorphone 69 (H) <50 ng/mL    Oxycodone Comments      Desmethyltramadol NEGATIVE <100 ng/mL    Tramadol NEGATIVE <100 ng/mL    Tramadol Comments      Zolpidem NEGATIVE <5 ng/mL    Zolpidem Metabolite NEGATIVE <5 ng/mL    Zolpidem Comments      Notes and Comments       Results are as expected.  yes        Controlled Substance Agreement:  Date of the Last Agreement: 2/6/2025  Reviewed Controlled Substance Agreement including but not limited to the benefits, risks, and alternatives to treatment with a Controlled Substance medication(s).    Opioids:  What is the patient's goal of therapy? Head pain control  Is this being achieved with current treatment? yes    I have calculated the patient's Morphine Dose Equivalent (MED):   I have considered referral to Pain Management  and/or a specialist, and do not feel it is necessary at this time.    I feel that it is clinically indicated to continue this current medication regimen after consideration of alternative therapies, and other non-opioid treatment.    Opioid Risk Screening:  No data recorded    Pain Assessment:  No data recorded      To review what we discussed today   Assessment/Plan   Problem List Items Addressed This Visit       Cluster headache - Primary    Intractable chronic migraine without aura and with status migrainosus       Your next appointment with me is controlled substance Q 3 months split between CNP and myself      Thank you for visiting the office of Dr Echo Millan. It was a pleasure working with you today.   Mary Corrales[address] Mon-Thursday  [facility] 5th floor [facility] Fridays  Our office phone number is [phone]. You can use this number to leave messages for Misti or to schedule or reschedule an appointment or request refills.  If you were seen on Thursday afternoon or Friday our office will call on Monday or Tuesday to reschedule your appointment. If you do not receive a call please call us.   We are also available for messages on my chart. We make every effort to respond to your concerns by the end of the next business day.

## 2025-09-04 DIAGNOSIS — G43.711 INTRACTABLE CHRONIC MIGRAINE WITHOUT AURA AND WITH STATUS MIGRAINOSUS: ICD-10-CM

## 2025-09-04 RX ORDER — HYDROCODONE BITARTRATE AND ACETAMINOPHEN 7.5; 325 MG/1; MG/1
1 TABLET ORAL EVERY 12 HOURS PRN
Qty: 40 TABLET | Refills: 0 | Status: SHIPPED | OUTPATIENT
Start: 2025-09-09

## 2025-11-06 ENCOUNTER — APPOINTMENT (OUTPATIENT)
Dept: NEUROLOGY | Facility: CLINIC | Age: 70
End: 2025-11-06
Payer: COMMERCIAL